# Patient Record
Sex: FEMALE | Race: WHITE | HISPANIC OR LATINO | Employment: FULL TIME | URBAN - METROPOLITAN AREA
[De-identification: names, ages, dates, MRNs, and addresses within clinical notes are randomized per-mention and may not be internally consistent; named-entity substitution may affect disease eponyms.]

---

## 2019-11-22 ENCOUNTER — HOSPITAL ENCOUNTER (EMERGENCY)
Facility: HOSPITAL | Age: 54
Discharge: HOME | End: 2019-11-23
Attending: EMERGENCY MEDICINE | Admitting: EMERGENCY MEDICINE
Payer: COMMERCIAL

## 2019-11-22 ENCOUNTER — APPOINTMENT (EMERGENCY)
Dept: RADIOLOGY | Facility: HOSPITAL | Age: 54
End: 2019-11-22
Payer: COMMERCIAL

## 2019-11-22 VITALS
RESPIRATION RATE: 16 BRPM | DIASTOLIC BLOOD PRESSURE: 65 MMHG | BODY MASS INDEX: 24.31 KG/M2 | WEIGHT: 151.24 LBS | SYSTOLIC BLOOD PRESSURE: 136 MMHG | TEMPERATURE: 97.7 F | OXYGEN SATURATION: 97 % | HEART RATE: 76 BPM | HEIGHT: 66 IN

## 2019-11-22 DIAGNOSIS — M54.50 ACUTE LOW BACK PAIN: Primary | ICD-10-CM

## 2019-11-22 DIAGNOSIS — M62.830 SPASM OF MUSCLE OF LOWER BACK: ICD-10-CM

## 2019-11-22 PROCEDURE — 96372 THER/PROPH/DIAG INJ SC/IM: CPT

## 2019-11-22 PROCEDURE — 99283 EMERGENCY DEPT VISIT LOW MDM: CPT

## 2019-11-22 PROCEDURE — 72100 X-RAY EXAM L-S SPINE 2/3 VWS: CPT

## 2019-11-22 RX ORDER — KETOROLAC TROMETHAMINE 30 MG/ML
30 INJECTION, SOLUTION INTRAMUSCULAR; INTRAVENOUS ONCE
Status: COMPLETED | OUTPATIENT
Start: 2019-11-22 | End: 2019-11-22

## 2019-11-22 RX ORDER — METHOCARBAMOL 500 MG/1
500 TABLET, FILM COATED ORAL ONCE
Status: COMPLETED | OUTPATIENT
Start: 2019-11-22 | End: 2019-11-22

## 2019-11-22 RX ORDER — LIDOCAINE 50 MG/G
1 PATCH TOPICAL ONCE
Status: DISCONTINUED | OUTPATIENT
Start: 2019-11-22 | End: 2019-11-23 | Stop reason: HOSPADM

## 2019-11-22 RX ADMIN — METHOCARBAMOL TABLETS 500 MG: 500 TABLET, COATED ORAL at 23:35

## 2019-11-22 RX ADMIN — KETOROLAC TROMETHAMINE 30 MG: 30 INJECTION, SOLUTION INTRAMUSCULAR at 23:33

## 2019-11-22 RX ADMIN — LIDOCAINE 1 PATCH: 50 PATCH TOPICAL at 23:35

## 2019-11-23 PROCEDURE — 99284 EMERGENCY DEPT VISIT MOD MDM: CPT | Performed by: EMERGENCY MEDICINE

## 2019-11-23 RX ORDER — LIDOCAINE 50 MG/G
1 PATCH TOPICAL DAILY PRN
Qty: 30 PATCH | Refills: 0 | Status: SHIPPED | OUTPATIENT
Start: 2019-11-23

## 2019-11-23 RX ORDER — METHOCARBAMOL 500 MG/1
500 TABLET, FILM COATED ORAL 4 TIMES DAILY PRN
Qty: 28 TABLET | Refills: 0 | Status: SHIPPED | OUTPATIENT
Start: 2019-11-23

## 2019-11-23 NOTE — DISCHARGE INSTRUCTIONS
Take ibuprofen (Motrin, Advil) or acetaminophen (Tylenol) as needed for pain, as per the instructions  Use ice or heat as it helps  Use topical medications, such as Yossi-Santos or icy Hot, to the area, to help with the pain  Do stretching exercises to keep the muscles of your back loose  Take the muscle relaxer as needed for continued severe pain  Do your normal daily activities, but avoid strenuous activities or heavy lifting until you begin to feel better  Follow up your primary care doctor and the [address] [place] [address] for further evaluation and management of your back pain

## 2019-11-23 NOTE — ED PROVIDER NOTES
History  Chief Complaint   Patient presents with    Back Pain     Back pain started around 1800 pt was folding clothes and "back gave out" Pt reports difficulty walking  HPI    None       Past Medical History:   Diagnosis Date    Back pain     HIV (human immunodeficiency virus infection) ([facility] [address] )        Past Surgical History:   Procedure Laterality Date    CHOLECYSTECTOMY         History reviewed  No pertinent family history  I have reviewed and agree with the history as documented  Social History     Tobacco Use    Smoking status: Never Smoker    Smokeless tobacco: Never Used   Substance Use Topics    Alcohol use: Not Currently    Drug use: Never        Review of Systems    Physical Exam  Physical Exam   Constitutional: She is oriented to person, place, and time  She appears well-developed and well-nourished  No distress  HENT:   Head: Normocephalic and atraumatic  Eyes: Pupils are equal, round, and reactive to light  Conjunctivae are normal    Neck: Normal range of motion  No tracheal deviation present  Cardiovascular: Normal rate, regular rhythm and intact distal pulses  Pulmonary/Chest: Effort normal  No respiratory distress  Abdominal: Soft  She exhibits no distension  There is no tenderness  Musculoskeletal:        Lumbar back: She exhibits decreased range of motion (due to pain, in all directions; requires assistance to move from reclined to seated position due to pain) and tenderness (paraspinal, lower lumbar)  She exhibits no bony tenderness and no spasm  Back:    Neurological: She is alert and oriented to person, place, and time  GCS eye subscore is 4  GCS verbal subscore is 5  GCS motor subscore is 6  Reflex Scores:       Bicep reflexes are 2+ on the right side and 2+ on the left side  Normal strength and sensation bilateral legs  No saddle anesthesia  Skin: Skin is warm and dry  Psychiatric: She has a normal mood and affect   Her behavior is normal    Nursing note and vitals reviewed  Vital Signs  ED Triage Vitals [11/22/19 2254]   Temperature Pulse Respirations Blood Pressure SpO2   97 7 °F (36 5 °C) 76 16 136/65 97 %      Temp Source Heart Rate Source Patient Position - Orthostatic VS BP Location FiO2 (%)   Oral Monitor Sitting Right arm --      Pain Score       8           Vitals:    11/22/19 2254   BP: 136/65   Pulse: 76   Patient Position - Orthostatic VS: Sitting         Visual Acuity      ED Medications  Medications   lidocaine (LIDODERM) 5 % patch 1 patch (1 patch Topical Medication Applied 11/22/19 2335)   ketorolac (TORADOL) injection 30 mg (30 mg Intramuscular Given 11/22/19 2333)   methocarbamol (ROBAXIN) tablet 500 mg (500 mg Oral Given 11/22/19 2335)       Diagnostic Studies  Results Reviewed     None                 XR lumbar spine 2 or 3 views    (Results Pending)              Procedures  Procedures       ED Course                               MDM  Number of Diagnoses or Management Options  Acute low back pain: new and requires workup  Spasm of muscle of lower back: new and requires workup  Diagnosis management comments: This is a 47-year-old female who presents here today with low back pain  She states she has had mild problems with back pain before, usually exacerbated by standing for prolonged periods of time  She states it usually resolves with topical medications of acetaminophen, and has never seen anybody for this before  She states that she was on her feet for a prolonged period earlier this morning, but was not having any pain or issues at that time  At around 1800 she was folding laundry when she had sudden onset of pain  She states it is diffusely across her lower low back, it is a sharp pain  She says she is fine when she is sitting and still  It hurts worse with movement    She says she is having a hard time walking as while walking she will suddenly feel like her back grabs her, and has almost fallen over because of the sudden worsening of pain  She states both sides hurt equally  She denies any radiation of pain out of the back  She denies any weakness or numbness in her legs, abdominal pain, bowel or bladder incontinence  She did take 500 milligrams of acetaminophen without improvement of the pain  She has not taken or done anything else to try and help pain  She states the pain was not improving, prompting her to come in for evaluation  She does have a history of HIV, and states her counts were last checked back in June and were undetectable  She takes [facility] for [facility]  She denies prior significant complications related to either the HIV or her medications  She denies any other medical problems  Review of systems:  Otherwise negative unless stated as above    She is well-appearing, in no acute distress  She does have pain with trying to sit up in bed but is able to do so  She does have paraspinal tenderness to the low lumbar back bilaterally  Exam is otherwise unremarkable  I am not concerned about spinal compressive process such as cauda equina syndrome, spinal epidural abscess or hematoma  She is not having fevers or other systemic symptoms to suggest atypical infection, especially in the setting of undetectable viral load and no history of AIDS  One of the side effects of her anti-retroviral medication includes bony fractures, so she is at greater risk for lumbar spine fracture contributing to her worsening of pain  Given the intermittent grabbing sensation that she is having years at least a component of muscle spasms from this  We will check an x-ray of her back given possible side effects of her medications, and treat her pain while here  X-ray was reviewed by myself and shows no acute abnormalities  I discussed with the patient and her  findings, treatment at home, follow-up, and indications for return, and they expressed understanding with this plan         Amount and/or Complexity of Data Reviewed  Tests in the radiology section of CPT®: ordered and reviewed  Independent visualization of images, tracings, or specimens: yes        Disposition  Final diagnoses:   Acute low back pain   Spasm of muscle of lower back     Time reflects when diagnosis was documented in both MDM as applicable and the Disposition within this note     Time User Action Codes Description Comment    11/23/2019 12:34 AM Mahoney-Tesoriero, Delphina Castleman Add [M54 5] Acute low back pain     11/23/2019 12:34 AM Mahoney-Tesoriero, Delphina Castleman Add [R27 722] Spasm of muscle of lower back       ED Disposition     ED Disposition Condition Date/Time Comment    Discharge Good Sat Nov 23, 2019 12:34 AM Victorino Corrales discharge to home/self care        Follow-up Information     Follow up With Specialties Details Why Contact Info    your primary care doctor  Schedule an appointment as soon as possible for a visit  to follow up on your back pain     [facility]  Call  to set up an appointment with a local primary care doctor [phone]            Patient's Medications   Discharge Prescriptions    LIDOCAINE (LIDODERM) 5 %    Apply 1 patch topically daily as needed (back pain) Remove & Discard patch within 12 hours or as directed by MD       Start Date: 11/23/2019End Date: --       Order Dose: 1 patch       Quantity: 30 patch    Refills: 0    METHOCARBAMOL (ROBAXIN) 500 MG TABLET    Take 1 tablet (500 mg total) by mouth 4 (four) times a day as needed for muscle spasms (back pain)       Start Date: 11/23/2019End Date: --       Order Dose: 500 mg       Quantity: 28 tablet    Refills: 0         ED Provider  Electronically Signed by           Harsha Tuttle MD  11/23/19 0966

## 2019-11-25 ENCOUNTER — TELEPHONE (OUTPATIENT)
Dept: PHYSICAL THERAPY | Facility: OTHER | Age: 54
End: 2019-11-25

## 2019-11-25 NOTE — TELEPHONE ENCOUNTER
Voice mail/message left for patient to return call to [place] [id number] program including our hours of business and phone number  Nurse will need to address patients place of residence and proceed per protocol  Per EMR patient lives in [place] and shows one visit in [facility] system  Deferred per protocol for f/u

## 2019-11-27 ENCOUNTER — TELEPHONE (OUTPATIENT)
Dept: PHYSICAL THERAPY | Facility: OTHER | Age: 54
End: 2019-11-27

## 2019-11-27 NOTE — TELEPHONE ENCOUNTER
Voice mail/message left for patient to return call to [place] [id number] program including our hours of business and phone number  Patient to clarify place of residence before triage  Deferred per protocol for f/u call

## 2019-12-02 ENCOUNTER — TELEPHONE (OUTPATIENT)
Dept: PHYSICAL THERAPY | Facility: OTHER | Age: 54
End: 2019-12-02

## 2019-12-02 NOTE — TELEPHONE ENCOUNTER
Voice message left for patient to call back  Phone number and hours of business provided  This the 3rd attempt to reach the patient  Referral Closed

## 2021-12-30 ENCOUNTER — NURSE TRIAGE (OUTPATIENT)
Dept: OTHER | Facility: OTHER | Age: 56
End: 2021-12-30

## 2021-12-30 DIAGNOSIS — Z11.52 ENCOUNTER FOR SCREENING FOR COVID-19: Primary | ICD-10-CM

## 2021-12-30 PROCEDURE — 87636 SARSCOV2 & INF A&B AMP PRB: CPT | Performed by: FAMILY MEDICINE

## 2022-09-28 ENCOUNTER — APPOINTMENT (OUTPATIENT)
Dept: RADIOLOGY | Facility: HOSPITAL | Age: 57
End: 2022-09-28
Payer: COMMERCIAL

## 2022-09-28 ENCOUNTER — HOSPITAL ENCOUNTER (EMERGENCY)
Facility: HOSPITAL | Age: 57
Discharge: HOME | End: 2022-09-28
Attending: EMERGENCY MEDICINE
Payer: COMMERCIAL

## 2022-09-28 VITALS
TEMPERATURE: 99.3 F | RESPIRATION RATE: 15 BRPM | DIASTOLIC BLOOD PRESSURE: 72 MMHG | OXYGEN SATURATION: 99 % | BODY MASS INDEX: 25.9 KG/M2 | HEART RATE: 106 BPM | HEIGHT: 67 IN | WEIGHT: 165 LBS | SYSTOLIC BLOOD PRESSURE: 139 MMHG

## 2022-09-28 DIAGNOSIS — J30.2 SEASONAL ALLERGIES: ICD-10-CM

## 2022-09-28 DIAGNOSIS — R06.00 ACUTE DYSPNEA: ICD-10-CM

## 2022-09-28 DIAGNOSIS — J06.9 URI WITH COUGH AND CONGESTION: Primary | ICD-10-CM

## 2022-09-28 DIAGNOSIS — R07.89 NON-CARDIAC CHEST PAIN: ICD-10-CM

## 2022-09-28 LAB
ALBUMIN SERPL BCP-MCNC: 4 G/DL (ref 3.5–5)
ALP SERPL-CCNC: 171 U/L (ref 46–116)
ALT SERPL W P-5'-P-CCNC: 23 U/L (ref 12–78)
ANION GAP SERPL CALCULATED.3IONS-SCNC: 9 MMOL/L (ref 4–13)
AST SERPL W P-5'-P-CCNC: 12 U/L (ref 5–45)
BASOPHILS # BLD AUTO: 0.06 THOUSANDS/ΜL (ref 0–0.1)
BASOPHILS NFR BLD AUTO: 1 % (ref 0–1)
BILIRUB DIRECT SERPL-MCNC: 0.11 MG/DL (ref 0–0.2)
BILIRUB SERPL-MCNC: 0.46 MG/DL (ref 0.2–1)
BUN SERPL-MCNC: 11 MG/DL (ref 5–25)
CALCIUM SERPL-MCNC: 9.4 MG/DL (ref 8.3–10.1)
CARDIAC TROPONIN I PNL SERPL HS: <2 NG/L
CARDIAC TROPONIN I PNL SERPL HS: <2 NG/L
CHLORIDE SERPL-SCNC: 106 MMOL/L (ref 96–108)
CO2 SERPL-SCNC: 28 MMOL/L (ref 21–32)
CREAT SERPL-MCNC: 0.9 MG/DL (ref 0.6–1.3)
D DIMER PPP FEU-MCNC: <0.27 UG/ML FEU
EOSINOPHIL # BLD AUTO: 0.14 THOUSAND/ΜL (ref 0–0.61)
EOSINOPHIL NFR BLD AUTO: 2 % (ref 0–6)
ERYTHROCYTE [DISTWIDTH] IN BLOOD BY AUTOMATED COUNT: 13.5 % (ref 11.6–15.1)
FLUAV RNA RESP QL NAA+PROBE: NEGATIVE
FLUBV RNA RESP QL NAA+PROBE: NEGATIVE
GFR SERPL CREATININE-BSD FRML MDRD: 70 ML/MIN/1.73SQ M
GLUCOSE SERPL-MCNC: 118 MG/DL (ref 65–140)
HCT VFR BLD AUTO: 41 % (ref 34.8–46.1)
HGB BLD-MCNC: 13.7 G/DL (ref 11.5–15.4)
IMM GRANULOCYTES # BLD AUTO: 0.01 THOUSAND/UL (ref 0–0.2)
IMM GRANULOCYTES NFR BLD AUTO: 0 % (ref 0–2)
LYMPHOCYTES # BLD AUTO: 1.66 THOUSANDS/ΜL (ref 0.6–4.47)
LYMPHOCYTES NFR BLD AUTO: 25 % (ref 14–44)
MAGNESIUM SERPL-MCNC: 1.8 MG/DL (ref 1.6–2.6)
MCH RBC QN AUTO: 30 PG (ref 26.8–34.3)
MCHC RBC AUTO-ENTMCNC: 33.4 G/DL (ref 31.4–37.4)
MCV RBC AUTO: 90 FL (ref 82–98)
MONOCYTES # BLD AUTO: 0.68 THOUSAND/ΜL (ref 0.17–1.22)
MONOCYTES NFR BLD AUTO: 10 % (ref 4–12)
NEUTROPHILS # BLD AUTO: 4.05 THOUSANDS/ΜL (ref 1.85–7.62)
NEUTS SEG NFR BLD AUTO: 62 % (ref 43–75)
NRBC BLD AUTO-RTO: 0 /100 WBCS
PLATELET # BLD AUTO: 263 THOUSANDS/UL (ref 149–390)
PMV BLD AUTO: 10.3 FL (ref 8.9–12.7)
POTASSIUM SERPL-SCNC: 3.6 MMOL/L (ref 3.5–5.3)
PROT SERPL-MCNC: 8.6 G/DL (ref 6.4–8.4)
RBC # BLD AUTO: 4.57 MILLION/UL (ref 3.81–5.12)
RSV RNA RESP QL NAA+PROBE: NEGATIVE
SARS-COV-2 RNA RESP QL NAA+PROBE: NEGATIVE
SODIUM SERPL-SCNC: 143 MMOL/L (ref 135–147)
WBC # BLD AUTO: 6.6 THOUSAND/UL (ref 4.31–10.16)

## 2022-09-28 PROCEDURE — 0241U HB NFCT DS VIR RESP RNA 4 TRGT: CPT | Performed by: EMERGENCY MEDICINE

## 2022-09-28 PROCEDURE — 99285 EMERGENCY DEPT VISIT HI MDM: CPT

## 2022-09-28 PROCEDURE — 83735 ASSAY OF MAGNESIUM: CPT | Performed by: EMERGENCY MEDICINE

## 2022-09-28 PROCEDURE — 80076 HEPATIC FUNCTION PANEL: CPT | Performed by: EMERGENCY MEDICINE

## 2022-09-28 PROCEDURE — 85379 FIBRIN DEGRADATION QUANT: CPT | Performed by: EMERGENCY MEDICINE

## 2022-09-28 PROCEDURE — 80048 BASIC METABOLIC PNL TOTAL CA: CPT | Performed by: EMERGENCY MEDICINE

## 2022-09-28 PROCEDURE — 96374 THER/PROPH/DIAG INJ IV PUSH: CPT

## 2022-09-28 PROCEDURE — 94640 AIRWAY INHALATION TREATMENT: CPT

## 2022-09-28 PROCEDURE — 84484 ASSAY OF TROPONIN QUANT: CPT | Performed by: EMERGENCY MEDICINE

## 2022-09-28 PROCEDURE — 96361 HYDRATE IV INFUSION ADD-ON: CPT

## 2022-09-28 PROCEDURE — 99285 EMERGENCY DEPT VISIT HI MDM: CPT | Performed by: EMERGENCY MEDICINE

## 2022-09-28 PROCEDURE — 85025 COMPLETE CBC W/AUTO DIFF WBC: CPT | Performed by: EMERGENCY MEDICINE

## 2022-09-28 PROCEDURE — 71046 X-RAY EXAM CHEST 2 VIEWS: CPT

## 2022-09-28 PROCEDURE — 93005 ELECTROCARDIOGRAM TRACING: CPT

## 2022-09-28 PROCEDURE — 36415 COLL VENOUS BLD VENIPUNCTURE: CPT | Performed by: EMERGENCY MEDICINE

## 2022-09-28 RX ORDER — IPRATROPIUM BROMIDE AND ALBUTEROL SULFATE 2.5; .5 MG/3ML; MG/3ML
3 SOLUTION RESPIRATORY (INHALATION) ONCE
Status: COMPLETED | OUTPATIENT
Start: 2022-09-28 | End: 2022-09-28

## 2022-09-28 RX ORDER — KETOROLAC TROMETHAMINE 30 MG/ML
15 INJECTION, SOLUTION INTRAMUSCULAR; INTRAVENOUS ONCE
Status: COMPLETED | OUTPATIENT
Start: 2022-09-28 | End: 2022-09-28

## 2022-09-28 RX ORDER — ALBUTEROL SULFATE 90 UG/1
2 AEROSOL, METERED RESPIRATORY (INHALATION) ONCE
Status: COMPLETED | OUTPATIENT
Start: 2022-09-28 | End: 2022-09-28

## 2022-09-28 RX ORDER — LORATADINE 10 MG/1
10 TABLET ORAL ONCE
Status: COMPLETED | OUTPATIENT
Start: 2022-09-28 | End: 2022-09-28

## 2022-09-28 RX ADMIN — ALBUTEROL SULFATE 2 PUFF: 90 AEROSOL, METERED RESPIRATORY (INHALATION) at 12:44

## 2022-09-28 RX ADMIN — LORATADINE 10 MG: 10 TABLET ORAL at 12:46

## 2022-09-28 RX ADMIN — IPRATROPIUM BROMIDE AND ALBUTEROL SULFATE 3 ML: 2.5; .5 SOLUTION RESPIRATORY (INHALATION) at 12:46

## 2022-09-28 RX ADMIN — KETOROLAC TROMETHAMINE 15 MG: 30 INJECTION, SOLUTION INTRAMUSCULAR at 12:40

## 2022-09-28 RX ADMIN — SODIUM CHLORIDE 1000 ML: 0.9 INJECTION, SOLUTION INTRAVENOUS at 12:41

## 2022-09-28 NOTE — ED PROVIDER NOTES
History  Chief Complaint   Patient presents with    Chest Pain     Pt reports pressure in the chest that "takes my breath away" that began yesterday worsening today  Recently felt allergies/cold like symptoms- multiple negative at home covid tests      Patient is a 64-year-old female with past medical history of asthma, HIV on anti-retroviral medication with undetectable viral load, Hashimoto's thyroiditis but not currently on thyroid medication due to normal thyroid function tests, osteoporosis, cholecystectomy, presents to the emergency department complaining of acute difficulty breathing and chest discomfort that started earlier today  Patient states for the past several days she has had a cold or allergy symptoms  She has had itchy and runny nose, itchy scratchy throat, dry cough and today she had an episode where she felt a pressure in her chest that took her breath away  She states that it was a weird feeling" that she has never experienced before  She describes the sensation in her chest as actual chest pain  She denies any palpitations but states she does get this from time to time and attributes that to anxiety  Patient denies any diaphoresis, recent fevers or chills, headache, dizziness or near syncope, hemoptysis, abdominal pain, nausea, vomiting, change in bowel habits, urinary symptoms, skin rash or color change, leg pain or swelling, extremity weakness or paresthesia or other focal neurologic deficits  Patient reports taking multiple home COVID tests which were negative  History provided by:  Patient   used: No    Chest Pain  Associated symptoms: cough and shortness of breath    Associated symptoms: no abdominal pain, no back pain, no diaphoresis, no dizziness, no dysphagia, no fever, no headache, no nausea, no numbness, no palpitations, not vomiting and no weakness        Prior to Admission Medications   Prescriptions Last Dose Informant Patient Reported? Taking? lidocaine (LIDODERM) 5 %   No No   Sig: Apply 1 patch topically daily as needed (back pain) Remove & Discard patch within 12 hours or as directed by MD   methocarbamol (ROBAXIN) 500 mg tablet   No No   Sig: Take 1 tablet (500 mg total) by mouth 4 (four) times a day as needed for muscle spasms (back pain)      Facility-Administered Medications: None       Past Medical History:   Diagnosis Date    Back pain     Hashimoto's disease     [facility]        Past Surgical History:   Procedure Laterality Date    CHOLECYSTECTOMY         History reviewed  No pertinent family history  I have reviewed and agree with the history as documented  E-Cigarette/Vaping     E-Cigarette/Vaping Substances     Social History     Tobacco Use    Smoking status: Never Smoker    Smokeless tobacco: Never Used   Substance Use Topics    Alcohol use: Not Currently    Drug use: Never       Review of Systems   Constitutional: Negative for chills, diaphoresis and fever  HENT: Positive for postnasal drip and rhinorrhea  Negative for congestion, ear pain, sore throat, trouble swallowing and voice change  +Itchy/scratchy throat   Respiratory: Positive for cough and shortness of breath  Negative for chest tightness and wheezing  Cardiovascular: Positive for chest pain  Negative for palpitations and leg swelling  Gastrointestinal: Negative for abdominal pain, constipation, diarrhea, nausea and vomiting  Genitourinary: Negative for dysuria, flank pain, frequency and hematuria  Musculoskeletal: Negative for back pain, neck pain and neck stiffness  Skin: Negative for color change, pallor, rash and wound  Allergic/Immunologic: Positive for immunocompromised state  Neurological: Negative for dizziness, syncope, weakness, light-headedness, numbness and headaches  Hematological: Negative for adenopathy  Does not bruise/bleed easily     Psychiatric/Behavioral: Negative for confusion and decreased concentration  The patient is nervous/anxious  All other systems reviewed and are negative  Physical Exam  Physical Exam  Vitals and nursing note reviewed  Constitutional:       General: She is not in acute distress  Appearance: Normal appearance  She is well-developed  She is not ill-appearing, toxic-appearing or diaphoretic  HENT:      Head: Normocephalic and atraumatic  Right Ear: External ear normal       Left Ear: External ear normal       Mouth/Throat:      Mouth: Mucous membranes are moist       Pharynx: Oropharynx is clear  Posterior oropharyngeal erythema present  No oropharyngeal exudate  Eyes:      Extraocular Movements: Extraocular movements intact  Conjunctiva/sclera: Conjunctivae normal    Neck:      Vascular: No JVD  Cardiovascular:      Rate and Rhythm: Normal rate and regular rhythm  Pulses: Normal pulses  Heart sounds: Normal heart sounds  No murmur heard  No friction rub  No gallop  Pulmonary:      Effort: Pulmonary effort is normal  No respiratory distress  Breath sounds: Normal breath sounds  No wheezing, rhonchi or rales  Abdominal:      General: There is no distension  Palpations: Abdomen is soft  Tenderness: There is no abdominal tenderness  There is no guarding or rebound  Musculoskeletal:         General: No swelling or tenderness  Normal range of motion  Cervical back: Normal range of motion and neck supple  No rigidity  Right lower leg: No edema  Left lower leg: No edema  Skin:     General: Skin is warm and dry  Coloration: Skin is not pale  Findings: No erythema or rash  Neurological:      General: No focal deficit present  Mental Status: She is alert and oriented to person, place, and time  Sensory: No sensory deficit  Motor: No weakness     Psychiatric:         Mood and Affect: Mood normal          Behavior: Behavior normal          Vital Signs  ED Triage Vitals [09/28/22 1208]   Temperature Pulse Respirations Blood Pressure SpO2   99 3 °F (37 4 °C) 101 20 146/74 97 %      Temp Source Heart Rate Source Patient Position - Orthostatic VS BP Location FiO2 (%)   Temporal Monitor Sitting Left arm --      Pain Score       No Pain         Vitals:    09/28/22 1208 09/28/22 1300 09/28/22 1330   BP: 146/74 141/68 139/72   BP Location: Left arm Left arm Left arm   Pulse: 101 95 (!) 106   Resp: 20 13 15   Temp: 99 3 °F (37 4 °C)     TempSrc: Temporal     SpO2: 97% 100% 99%   Weight: 74 8 kg (165 lb)     Height: 5' 7" (1 702 m)         Visual Acuity      ED Medications  Medications   sodium chloride 0 9 % bolus 1,000 mL (1,000 mL Intravenous New Bag 9/28/22 1241)   ipratropium-albuterol (DUO-NEB) 0 5-2 5 mg/3 mL inhalation solution 3 mL (3 mL Nebulization Given 9/28/22 1246)   loratadine (CLARITIN) tablet 10 mg (10 mg Oral Given 9/28/22 1246)   albuterol (PROVENTIL HFA,VENTOLIN HFA) inhaler 2 puff (2 puffs Inhalation Given 9/28/22 1244)   ketorolac (TORADOL) injection 15 mg (15 mg Intravenous Given 9/28/22 1240)       Diagnostic Studies  Results Reviewed     Procedure Component Value Units Date/Time    HS Troponin I 2hr [[id number]] Collected: 09/28/22 1517    Lab Status: Final result Specimen: Blood from Arm, Left Updated: 09/28/22 1547     hs TnI 2hr <2 ng/L      Delta 2hr hsTnI --    FLU/RSV/COVID - if FLU/RSV clinically relevant [[id number]]  (Normal) Collected: 09/28/22 1240    Lab Status: Final result Specimen: Nares from Nose Updated: 09/28/22 1349     SARS-CoV-2 Negative     INFLUENZA A PCR Negative     INFLUENZA B PCR Negative     RSV PCR Negative    Narrative:      FOR PEDIATRIC PATIENTS - copy/paste COVID Guidelines URL to browser: [email or web address]  [email or web address]    SARS-CoV-2 assay is a Nucleic Acid Amplification assay intended for the  qualitative detection of nucleic acid from SARS-CoV-2 in nasopharyngeal  swabs   Results are for the presumptive identification of SARS-CoV-2 RNA  Positive results are indicative of infection with SARS-CoV-2, the virus  causing COVID-19, but do not rule out bacterial infection or co-infection  with other viruses  Laboratories within the [place] and its  territories are required to report all positive results to the appropriate  public health authorities  Negative results do not preclude SARS-CoV-2  infection and should not be used as the sole basis for treatment or other  patient management decisions  Negative results must be combined with  clinical observations, patient history, and epidemiological information  This test has not been FDA cleared or approved  This test has been authorized by FDA under an Emergency Use Authorization  (EUA)  This test is only authorized for the duration of time the  declaration that circumstances exist justifying the authorization of the  emergency use of an in vitro diagnostic tests for detection of SARS-CoV-2  virus and/or diagnosis of COVID-19 infection under section 564(b)(1) of  the Act, 21 U  S C  036JQX-4(Q)(1), unless the authorization is terminated  or revoked sooner  The test has been validated but independent review by FDA  and CLIA is pending  Test performed using [employer] GeneXpert: This RT-PCR assay targets N2,  a region unique to SARS-CoV-2  A conserved region in the E-gene was chosen  for pan-Sarbecovirus detection which includes SARS-CoV-2  According to CMS-2020-01-R, this platform meets the definition of high-throughput technology      Basic metabolic panel [[id number]] Collected: 09/28/22 1240    Lab Status: Final result Specimen: Blood from Arm, Left Updated: 09/28/22 1322     Sodium 143 mmol/L      Potassium 3 6 mmol/L      Chloride 106 mmol/L      CO2 28 mmol/L      ANION GAP 9 mmol/L      BUN 11 mg/dL      Creatinine 0 90 mg/dL      Glucose 118 mg/dL      Calcium 9 4 mg/dL      eGFR 70 ml/min/1 73sq m     Narrative:      National Kidney Disease Foundation guidelines for Chronic Kidney Disease (CKD):     Stage 1 with normal or high GFR (GFR > 90 mL/min/1 73 square meters)    Stage 2 Mild CKD (GFR = 60-89 mL/min/1 73 square meters)    Stage 3A Moderate CKD (GFR = 45-59 mL/min/1 73 square meters)    Stage 3B Moderate CKD (GFR = 30-44 mL/min/1 73 square meters)    Stage 4 Severe CKD (GFR = 15-29 mL/min/1 73 square meters)    Stage 5 End Stage CKD (GFR <15 mL/min/1 73 square meters)  Note: GFR calculation is accurate only with a steady state creatinine    Magnesium [[id number]]  (Normal) Collected: 09/28/22 1240    Lab Status: Final result Specimen: Blood from Arm, Left Updated: 09/28/22 1322     Magnesium 1 8 mg/dL     Hepatic function panel [[id number]]  (Abnormal) Collected: 09/28/22 1240    Lab Status: Final result Specimen: Blood from Arm, Left Updated: 09/28/22 1322     Total Bilirubin 0 46 mg/dL      Bilirubin, Direct 0 11 mg/dL      Alkaline Phosphatase 171 U/L      AST 12 U/L      ALT 23 U/L      Total Protein 8 6 g/dL      Albumin 4 0 g/dL     HS Troponin 0hr (reflex protocol) [[id number]]  (Normal) Collected: 09/28/22 1240    Lab Status: Final result Specimen: Blood from Arm, Left Updated: 09/28/22 1319     hs TnI 0hr <2 ng/L     D-Dimer [[id number]]  (Normal) Collected: 09/28/22 1240    Lab Status: Final result Specimen: Blood from Arm, Left Updated: 09/28/22 1315     D-Dimer, Quant <0 27 ug/ml FEU     Narrative: In the evaluation for possible pulmonary embolism, in the appropriate (Well's Score of 4 or less) patient, the age adjusted d-dimer cutoff for this patient can be calculated as:    Age x 0 01 (in ug/mL) for Age-adjusted D-dimer exclusion threshold for a patient over 50 years      CBC and differential [[id number]] Collected: 09/28/22 1240    Lab Status: Final result Specimen: Blood from Arm, Left Updated: 09/28/22 1255     WBC 6 60 Thousand/uL      RBC 4 57 Million/uL      Hemoglobin 13 7 g/dL      Hematocrit 41 0 %      MCV 90 fL MCH 30 0 pg      MCHC 33 4 g/dL      RDW 13 5 %      MPV 10 3 fL      Platelets 307 Thousands/uL      nRBC 0 /100 WBCs      Neutrophils Relative 62 %      Immat GRANS % 0 %      Lymphocytes Relative 25 %      Monocytes Relative 10 %      Eosinophils Relative 2 %      Basophils Relative 1 %      Neutrophils Absolute 4 05 Thousands/µL      Immature Grans Absolute 0 01 Thousand/uL      Lymphocytes Absolute 1 66 Thousands/µL      Monocytes Absolute 0 68 Thousand/µL      Eosinophils Absolute 0 14 Thousand/µL      Basophils Absolute 0 06 Thousands/µL                  XR chest 2 views   ED Interpretation by Rahul Luque DO (09/28 1420)   No acute abnormality in the chest                  Procedures  ECG 12 Lead Documentation Only    Date/Time: 9/28/2022 12:09 PM  Performed by: Rahul Luque DO  Authorized by: Rahul Luque DO     ECG reviewed by me, the ED Provider: yes    Patient location:  ED  Rate:     ECG rate:  97    ECG rate assessment: normal    Rhythm:     Rhythm: sinus rhythm    Ectopy:     Ectopy: none    QRS:     QRS axis:  Normal    QRS intervals:  Normal  Conduction:     Conduction: abnormal      Abnormal conduction: incomplete RBBB    ST segments:     ST segments:  Normal  T waves:     T waves: normal      ECG 12 Lead Documentation Only    Date/Time: 9/28/2022 3:00 PM  Performed by: Rahul Luque DO  Authorized by: Rahul Luque DO     ECG reviewed by me, the ED Provider: yes    Patient location:  ED  Previous ECG:     Previous ECG:  Compared to current    Similarity:  No change  Rate:     ECG rate:  103    ECG rate assessment: tachycardic    Rhythm:     Rhythm: sinus tachycardia    Ectopy:     Ectopy: none    QRS:     QRS axis:  Normal    QRS intervals:  Normal  Conduction:     Conduction: abnormal      Abnormal conduction: incomplete RBBB    ST segments:     ST segments:  Normal  T waves:     T waves: normal               ED Course  ED Course as of 09/28/22 1551   Wed Sep 28, 2022   1455 Updated patient about normal workup thus far  Patient overall feeling better  Waiting on repeat troponin EKG but if unremarkable, will discharge home  1550 Updated patient about repeat troponin and EKG being normal   Patient stable for discharge at this time  Recommended she follow-up with a PCP  Discussed symptomatic management at home and when to return to the ER  HEART Risk Score    Flowsheet Row Most Recent Value   Heart Score Risk Calculator    History 0 Filed at: 09/28/2022 1456   ECG 0 Filed at: 09/28/2022 1456   Age 1 Filed at: 09/28/2022 1456   Risk Factors 0 Filed at: 09/28/2022 1456   Troponin 0 Filed at: 09/28/2022 1456   HEART Score 1 Filed at: 09/28/2022 1456                  MDM  Number of Diagnoses or Management Options  Diagnosis management comments: 24-year-old female presents to the ED for acute dyspnea and chest discomfort earlier today in the setting of recent dry cough and URI symptoms  Differential includes bronchospasm, musculoskeletal pain, anxiety attack, acute PE, pericarditis, pleurisy, pneumonia, esophageal spasm or GERD  Will workup with cardiac labs, D-dimer and EKG  Pending D-dimer will either obtain chest x-ray or CTA chest   Will give DuoNeb breathing treatment for possible bronchospasm, Toradol for throat discomfort, Claritin for allergies         Amount and/or Complexity of Data Reviewed  Clinical lab tests: ordered and reviewed  Tests in the radiology section of CPT®: ordered and reviewed  Tests in the medicine section of CPT®: reviewed and ordered  Independent visualization of images, tracings, or specimens: yes        Disposition  Final diagnoses:   URI with cough and congestion   Seasonal allergies   Acute dyspnea   Non-cardiac chest pain     Time reflects when diagnosis was documented in both MDM as applicable and the Disposition within this note     Time User Action Codes Description Comment    9/28/2022  3:48 PM Malachi Bevels E Add [J06 9] URI with cough and congestion     9/28/2022  3:48 PM Albion Bevels E Add [J30 2] Seasonal allergies     9/28/2022  3:49 PM Albion Bevels E Add [R06 00] Acute dyspnea     9/28/2022  3:49 PM Albion Bevels E Add [R07 89] Non-cardiac chest pain       ED Disposition     ED Disposition   Discharge    Condition   Stable    Date/Time   Wed Sep 28, 2022  3:49 PM    Comment   Zi Graver discharge to home/self care  Follow-up Information     Follow up With Specialties Details Why Contact Info Additional Information    Infolink  Call  To establish care with a primary care doctor if you do not already have one [address] [facility] Emergency Department Emergency Medicine Go to  If symptoms worsen [address] Emergency Department, [address] [place] [address], [facility]          Patient's Medications   Discharge Prescriptions    No medications on file       No discharge procedures on file      PDMP Review     None          ED Provider  Electronically Signed by           Jerod Gonzalez DO  09/28/22 9325

## 2022-09-30 LAB
ATRIAL RATE: 103 BPM
P AXIS: 70 DEGREES
PR INTERVAL: 154 MS
QRS AXIS: 62 DEGREES
QRSD INTERVAL: 88 MS
QT INTERVAL: 366 MS
QTC INTERVAL: 479 MS
T WAVE AXIS: 57 DEGREES
VENTRICULAR RATE: 103 BPM

## 2022-09-30 PROCEDURE — 93010 ELECTROCARDIOGRAM REPORT: CPT | Performed by: INTERNAL MEDICINE

## 2023-05-16 ENCOUNTER — HOSPITAL ENCOUNTER (OUTPATIENT)
Facility: HOSPITAL | Age: 58
Setting detail: OBSERVATION
Discharge: HOME | End: 2023-05-17
Attending: EMERGENCY MEDICINE | Admitting: STUDENT IN AN ORGANIZED HEALTH CARE EDUCATION/TRAINING PROGRAM

## 2023-05-16 ENCOUNTER — APPOINTMENT (EMERGENCY)
Dept: RADIOLOGY | Facility: HOSPITAL | Age: 58
End: 2023-05-16

## 2023-05-16 DIAGNOSIS — R00.2 PALPITATIONS: Primary | ICD-10-CM

## 2023-05-16 DIAGNOSIS — N39.0 URINARY TRACT INFECTION WITHOUT HEMATURIA, SITE UNSPECIFIED: ICD-10-CM

## 2023-05-16 PROBLEM — Z21 HIV (HUMAN IMMUNODEFICIENCY VIRUS INFECTION) (HCC): Status: ACTIVE | Noted: 2023-05-16

## 2023-05-16 PROBLEM — K21.9 GERD (GASTROESOPHAGEAL REFLUX DISEASE): Status: ACTIVE | Noted: 2023-05-16

## 2023-05-16 PROBLEM — B20 HIV (HUMAN IMMUNODEFICIENCY VIRUS INFECTION) (HCC): Status: ACTIVE | Noted: 2023-05-16

## 2023-05-16 LAB
2HR DELTA HS TROPONIN: <0 NG/L
4HR DELTA HS TROPONIN: <0 NG/L
ALBUMIN SERPL BCP-MCNC: 4.7 G/DL (ref 3.5–5)
ALP SERPL-CCNC: 151 U/L (ref 34–104)
ALT SERPL W P-5'-P-CCNC: 14 U/L (ref 7–52)
ANION GAP SERPL CALCULATED.3IONS-SCNC: 8 MMOL/L (ref 4–13)
AST SERPL W P-5'-P-CCNC: 14 U/L (ref 13–39)
BASOPHILS # BLD AUTO: 0.09 THOUSANDS/ÂΜL (ref 0–0.1)
BASOPHILS NFR BLD AUTO: 1 % (ref 0–1)
BILIRUB SERPL-MCNC: 0.48 MG/DL (ref 0.2–1)
BUN SERPL-MCNC: 16 MG/DL (ref 5–25)
CALCIUM SERPL-MCNC: 10.3 MG/DL (ref 8.4–10.2)
CARDIAC TROPONIN I PNL SERPL HS: 2 NG/L
CARDIAC TROPONIN I PNL SERPL HS: <2 NG/L
CARDIAC TROPONIN I PNL SERPL HS: <2 NG/L
CHLORIDE SERPL-SCNC: 104 MMOL/L (ref 96–108)
CO2 SERPL-SCNC: 24 MMOL/L (ref 21–32)
CREAT SERPL-MCNC: 0.71 MG/DL (ref 0.6–1.3)
EOSINOPHIL # BLD AUTO: 0.36 THOUSAND/ÂΜL (ref 0–0.61)
EOSINOPHIL NFR BLD AUTO: 4 % (ref 0–6)
ERYTHROCYTE [DISTWIDTH] IN BLOOD BY AUTOMATED COUNT: 13.6 % (ref 11.6–15.1)
GFR SERPL CREATININE-BSD FRML MDRD: 92 ML/MIN/1.73SQ M
GLUCOSE SERPL-MCNC: 107 MG/DL (ref 65–140)
HCT VFR BLD AUTO: 40.8 % (ref 34.8–46.1)
HGB BLD-MCNC: 13.6 G/DL (ref 11.5–15.4)
IMM GRANULOCYTES # BLD AUTO: 0.01 THOUSAND/UL (ref 0–0.2)
IMM GRANULOCYTES NFR BLD AUTO: 0 % (ref 0–2)
LYMPHOCYTES # BLD AUTO: 2.47 THOUSANDS/ÂΜL (ref 0.6–4.47)
LYMPHOCYTES NFR BLD AUTO: 30 % (ref 14–44)
MAGNESIUM SERPL-MCNC: 2.1 MG/DL (ref 1.9–2.7)
MCH RBC QN AUTO: 29.7 PG (ref 26.8–34.3)
MCHC RBC AUTO-ENTMCNC: 33.3 G/DL (ref 31.4–37.4)
MCV RBC AUTO: 89 FL (ref 82–98)
MONOCYTES # BLD AUTO: 0.82 THOUSAND/ÂΜL (ref 0.17–1.22)
MONOCYTES NFR BLD AUTO: 10 % (ref 4–12)
NEUTROPHILS # BLD AUTO: 4.45 THOUSANDS/ÂΜL (ref 1.85–7.62)
NEUTS SEG NFR BLD AUTO: 55 % (ref 43–75)
NRBC BLD AUTO-RTO: 0 /100 WBCS
PLATELET # BLD AUTO: 269 THOUSANDS/UL (ref 149–390)
PMV BLD AUTO: 10 FL (ref 8.9–12.7)
POTASSIUM SERPL-SCNC: 4 MMOL/L (ref 3.5–5.3)
PROT SERPL-MCNC: 8.4 G/DL (ref 6.4–8.4)
RBC # BLD AUTO: 4.58 MILLION/UL (ref 3.81–5.12)
SODIUM SERPL-SCNC: 136 MMOL/L (ref 135–147)
TSH SERPL DL<=0.05 MIU/L-ACNC: 0.68 UIU/ML (ref 0.45–4.5)
WBC # BLD AUTO: 8.2 THOUSAND/UL (ref 4.31–10.16)

## 2023-05-16 RX ORDER — ACETAMINOPHEN 325 MG/1
650 TABLET ORAL EVERY 6 HOURS PRN
Status: DISCONTINUED | OUTPATIENT
Start: 2023-05-16 | End: 2023-05-17 | Stop reason: HOSPADM

## 2023-05-16 RX ORDER — NITROFURANTOIN 25; 75 MG/1; MG/1
100 CAPSULE ORAL
Status: DISCONTINUED | OUTPATIENT
Start: 2023-05-16 | End: 2023-05-17 | Stop reason: HOSPADM

## 2023-05-16 RX ORDER — FAMOTIDINE 20 MG/1
40 TABLET, FILM COATED ORAL DAILY
Status: DISCONTINUED | OUTPATIENT
Start: 2023-05-17 | End: 2023-05-17 | Stop reason: HOSPADM

## 2023-05-16 RX ADMIN — SODIUM CHLORIDE 1000 ML: 0.9 INJECTION, SOLUTION INTRAVENOUS at 18:36

## 2023-05-16 NOTE — ED PROVIDER NOTES
History  Chief Complaint   Patient presents with   • Palpitations     Pt reporting palpitations that started 2 days ago  Pt reports the sensation is intermittent  59 y o  F presents w palpitations 2 days ago  Intermittent sensation  History of Hashimoto's thyroiditis, but no history of similar sensation in the past   Patient denies chest pain, denies shortness of breath  No fever, chills, no upper respiratory tract infection symptoms  Prior to Admission Medications   Prescriptions Last Dose Informant Patient Reported? Taking? Peuwnhbnch-Fswizusv-Pownnwi AF (ODEFSEY PO) Unknown  Yes No   Sig: Take by mouth   famotidine (PEPCID) 40 MG tablet Unknown  Yes No   Sig: Take 40 mg by mouth daily      Facility-Administered Medications: None       Past Medical History:   Diagnosis Date   • Back pain    • Hashimoto's disease    • [facility]    • Urinary tract infection        Past Surgical History:   Procedure Laterality Date   • CHOLECYSTECTOMY         No family history on file  I have reviewed and agree with the history as documented  E-Cigarette/Vaping     E-Cigarette/Vaping Substances     Social History     Tobacco Use   • Smoking status: Never   • Smokeless tobacco: Never   Substance Use Topics   • Alcohol use: Not Currently   • Drug use: Never       Review of Systems   Constitutional: Positive for fatigue  Negative for chills, diaphoresis and fever  HENT: Negative for congestion and sore throat  Respiratory: Negative for cough, chest tightness and shortness of breath  Cardiovascular: Positive for palpitations  Negative for chest pain and leg swelling  Gastrointestinal: Negative for abdominal pain, constipation, diarrhea, nausea and vomiting  Genitourinary: Negative for dysuria and flank pain  Musculoskeletal: Negative for back pain and neck pain  Skin: Negative for color change and rash     Allergic/Immunologic: Negative for immunocompromised state    Neurological: Negative for dizziness, syncope and headaches  Physical Exam  Physical Exam  Vitals reviewed  Constitutional:       General: She is not in acute distress  Appearance: She is well-developed  She is not ill-appearing, toxic-appearing or diaphoretic  HENT:      Head: Normocephalic and atraumatic  Mouth/Throat:      Mouth: Mucous membranes are moist    Eyes:      General: No scleral icterus  Right eye: No discharge  Left eye: No discharge  Conjunctiva/sclera: Conjunctivae normal       Pupils: Pupils are equal, round, and reactive to light  Neck:      Vascular: No JVD  Cardiovascular:      Rate and Rhythm: Normal rate and regular rhythm  Heart sounds: Normal heart sounds  No murmur heard  No friction rub  No gallop  Pulmonary:      Effort: Pulmonary effort is normal  No respiratory distress  Breath sounds: Normal breath sounds  No wheezing or rales  Chest:      Chest wall: No tenderness  Abdominal:      General: Bowel sounds are normal  There is no distension  Palpations: Abdomen is soft  Tenderness: There is no abdominal tenderness  There is no right CVA tenderness, left CVA tenderness or guarding  Musculoskeletal:         General: No tenderness or deformity  Normal range of motion  Cervical back: Normal range of motion and neck supple  Right lower leg: No edema  Left lower leg: No edema  Skin:     General: Skin is warm and dry  Coloration: Skin is not pale  Findings: No erythema or rash  Neurological:      Mental Status: She is alert and oriented to person, place, and time  Cranial Nerves: No cranial nerve deficit     Psychiatric:         Behavior: Behavior normal          Vital Signs  ED Triage Vitals   Temperature Pulse Respirations Blood Pressure SpO2   05/16/23 1542 05/16/23 1542 05/16/23 1542 05/16/23 1542 05/16/23 1542   98 °F (36 7 °C) 91 18 (!) 169/110 98 %      Temp Source Heart Rate Source Patient Position - Orthostatic VS BP Location FiO2 (%)   05/16/23 1542 05/16/23 1542 05/16/23 1542 05/16/23 1542 --   Temporal Monitor Sitting Left arm       Pain Score       05/16/23 1839       No Pain           Vitals:    05/17/23 1100 05/17/23 1500 05/17/23 1533 05/17/23 1600   BP: 114/67  129/75 122/71   Pulse: 79 86 92 86   Patient Position - Orthostatic VS:    Lying         Visual Acuity  Visual Acuity    Flowsheet Row Most Recent Value   L Pupil Size (mm) 3   R Pupil Size (mm) 3          ED Medications  Medications   acetaminophen (TYLENOL) tablet 650 mg (has no administration in time range)   famotidine (PEPCID) tablet 40 mg (has no administration in time range)   nitrofurantoin (MACROBID) extended-release capsule 100 mg (100 mg Oral Given 5/17/23 0007)   Vgmvgydoxi-Iqgvkyxi-Dwmytez AF (ODEFSEY) tablet 1 tablet (1 tablet Oral Given 5/17/23 1148)   metoprolol succinate (TOPROL-XL) 24 hr tablet 25 mg (has no administration in time range)   sodium chloride 0 9 % bolus 1,000 mL (0 mL Intravenous Stopped 5/16/23 2225)       Diagnostic Studies  Results Reviewed     Procedure Component Value Units Date/Time    Basic metabolic panel [[id number]]  (Abnormal) Collected: 05/17/23 0448    Lab Status: Final result Specimen: Blood from Arm, Left Updated: 05/17/23 0512     Sodium 140 mmol/L      Potassium 3 8 mmol/L      Chloride 109 mmol/L      CO2 26 mmol/L      ANION GAP 5 mmol/L      BUN 12 mg/dL      Creatinine 0 67 mg/dL      Glucose 103 mg/dL      Calcium 9 5 mg/dL      eGFR 95 ml/min/1 73sq m     Narrative:      [facility] guidelines for Chronic Kidney Disease (CKD):   •  Stage 1 with normal or high GFR (GFR > 90 mL/min/1 73 square meters)  •  Stage 2 Mild CKD (GFR = 60-89 mL/min/1 73 square meters)  •  Stage 3A Moderate CKD (GFR = 45-59 mL/min/1 73 square meters)  •  Stage 3B Moderate CKD (GFR = 30-44 mL/min/1 73 square meters)  •  Stage 4 Severe CKD (GFR = 15-29 mL/min/1 73 square meters)  •  Stage 5 End Stage CKD (GFR <15 mL/min/1 73 square meters)  Note: GFR calculation is accurate only with a steady state creatinine    CBC (With Platelets) [[id number]]  (Normal) Collected: 05/17/23 0448    Lab Status: Final result Specimen: Blood from Arm, Left Updated: 05/17/23 0455     WBC 6 62 Thousand/uL      RBC 4 11 Million/uL      Hemoglobin 12 2 g/dL      Hematocrit 36 5 %      MCV 89 fL      MCH 29 7 pg      MCHC 33 4 g/dL      RDW 13 5 %      Platelets 022 Thousands/uL      MPV 10 1 fL     HS Troponin I 4hr [[id number]]  (Normal) Collected: 05/16/23 2029    Lab Status: Final result Specimen: Blood from Arm, Left Updated: 05/16/23 2100     hs TnI 4hr <2 ng/L      Delta 4hr hsTnI <0 ng/L     HS Troponin I 2hr [[id number]]  (Normal) Collected: 05/16/23 1836    Lab Status: Final result Specimen: Blood from Arm, Left Updated: 05/16/23 1927     hs TnI 2hr <2 ng/L      Delta 2hr hsTnI <0 ng/L     TSH, 3rd generation with Free T4 reflex [[id number]]  (Normal) Collected: 05/16/23 1836    Lab Status: Final result Specimen: Blood from Arm, Left Updated: 05/16/23 1923     TSH 3RD GENERATON 0 681 uIU/mL     Magnesium [[id number]]  (Normal) Collected: 05/16/23 1836    Lab Status: Final result Specimen: Blood from Arm, Left Updated: 05/16/23 1906     Magnesium 2 1 mg/dL     HS Troponin 0hr (reflex protocol) [[id number]]  (Normal) Collected: 05/16/23 1546    Lab Status: Final result Specimen: Blood from Arm, Left Updated: 05/16/23 1630     hs TnI 0hr 2 ng/L     Comprehensive metabolic panel [[id number]]  (Abnormal) Collected: 05/16/23 1546    Lab Status: Final result Specimen: Blood from Arm, Left Updated: 05/16/23 1608     Sodium 136 mmol/L      Potassium 4 0 mmol/L      Chloride 104 mmol/L      CO2 24 mmol/L      ANION GAP 8 mmol/L      BUN 16 mg/dL      Creatinine 0 71 mg/dL      Glucose 107 mg/dL      Calcium 10 3 mg/dL      AST 14 U/L      ALT 14 U/L      Alkaline Phosphatase 151 U/L      Total Protein 8 4 g/dL      Albumin 4 7 g/dL      Total Bilirubin 0 48 mg/dL      eGFR 92 ml/min/1 73sq m     Narrative:      [employer] guidelines for Chronic Kidney Disease (CKD):   •  Stage 1 with normal or high GFR (GFR > 90 mL/min/1 73 square meters)  •  Stage 2 Mild CKD (GFR = 60-89 mL/min/1 73 square meters)  •  Stage 3A Moderate CKD (GFR = 45-59 mL/min/1 73 square meters)  •  Stage 3B Moderate CKD (GFR = 30-44 mL/min/1 73 square meters)  •  Stage 4 Severe CKD (GFR = 15-29 mL/min/1 73 square meters)  •  Stage 5 End Stage CKD (GFR <15 mL/min/1 73 square meters)  Note: GFR calculation is accurate only with a steady state creatinine    CBC and differential [[id number]] Collected: 05/16/23 1546    Lab Status: Final result Specimen: Blood from Arm, Left Updated: 05/16/23 1280     WBC 8 20 Thousand/uL      RBC 4 58 Million/uL      Hemoglobin 13 6 g/dL      Hematocrit 40 8 %      MCV 89 fL      MCH 29 7 pg      MCHC 33 3 g/dL      RDW 13 6 %      MPV 10 0 fL      Platelets 498 Thousands/uL      nRBC 0 /100 WBCs      Neutrophils Relative 55 %      Immat GRANS % 0 %      Lymphocytes Relative 30 %      Monocytes Relative 10 %      Eosinophils Relative 4 %      Basophils Relative 1 %      Neutrophils Absolute 4 45 Thousands/µL      Immature Grans Absolute 0 01 Thousand/uL      Lymphocytes Absolute 2 47 Thousands/µL      Monocytes Absolute 0 82 Thousand/µL      Eosinophils Absolute 0 36 Thousand/µL      Basophils Absolute 0 09 Thousands/µL                  XR chest 2 views   ED Interpretation by Edilma Stewart DO (05/16 2114)   No acute cardiopulmonary abnormalities      Final Result by Mordecai Kehr, MD (50/94 [id number])      No acute cardiopulmonary disease        Findings are stable            Workstation performed: [id number]                    Procedures  Procedures         ED Course  ED Course as of 05/17/23 1656   Tue May 16, 2023   1957 Magnesium: 2 1   1955 TSH 3RD GENERATON: 0 681   2058 Delta 2hr hsTnI: <0   2130 TT sent to cards to discuss PACs / atrial bigeminy                                              Medical Decision Making  Palpitations  ACS work-up, electrolytes, thyroid function studies  IV fluid    ACS work-up is normal here, no electrolyte abnormalities  Patient does continue to have irregular heartbeat with multiple PACs, atrial bigeminy, discussed with cardiology, advised admission for echo and monitoring overnight  Patient agreeable with plan  Amount and/or Complexity of Data Reviewed  Labs: ordered  Decision-making details documented in ED Course  Radiology: ordered and independent interpretation performed  Risk  Decision regarding hospitalization  Disposition  Final diagnoses:   Palpitations     Time reflects when diagnosis was documented in both MDM as applicable and the Disposition within this note     Time User Action Codes Description Comment    5/16/2023 10:38 PM Juan Bowman Add [R00 2] Palpitations     5/16/2023 10:39 PM Phylicia Jung Modify [R00 2] Palpitations     5/17/2023  3:47 PM Yulia Dumont Add [N39 0] Urinary tract infection without hematuria, site unspecified       ED Disposition     ED Disposition   Admit    Condition   Stable    Date/Time   Tue May 16, 2023 10:39 PM    Comment   Case was discussed with   Sandeep Barajas (SLIM AP) and the patient's admission status was agreed to be Admission Status: observation status to the service of Dr [facility] Carrillo [facility])   Follow-up Information    None         Patient's Medications   Discharge Prescriptions    METOPROLOL SUCCINATE (TOPROL-XL) 25 MG 24 HR TABLET    Take 1 tablet (25 mg total) by mouth daily Do not start before May 18, 2023         Start Date: 5/18/2023 End Date: 6/17/2023       Order Dose: 25 mg       Quantity: 30 tablet    Refills: 0    NITROFURANTOIN (MACROBID) 100 MG CAPSULE    Take 1 capsule (100 mg total) by mouth daily at bedtime       Start Date: 5/17/2023 End Date: -- Order Dose: 100 mg       Quantity: --    Refills: 0       Outpatient Discharge Orders   Holter monitor   Standing Status: Future Standing Exp   Date: 05/17/27       PDMP Review     None          ED Provider  Electronically Signed by           Rosalva Hernandez DO  05/17/23 0599

## 2023-05-17 ENCOUNTER — APPOINTMENT (OUTPATIENT)
Dept: NUCLEAR MEDICINE | Facility: HOSPITAL | Age: 58
End: 2023-05-17

## 2023-05-17 ENCOUNTER — APPOINTMENT (OUTPATIENT)
Dept: NON INVASIVE DIAGNOSTICS | Facility: HOSPITAL | Age: 58
End: 2023-05-17

## 2023-05-17 VITALS
RESPIRATION RATE: 20 BRPM | WEIGHT: 165 LBS | HEIGHT: 67 IN | OXYGEN SATURATION: 96 % | SYSTOLIC BLOOD PRESSURE: 124 MMHG | TEMPERATURE: 98 F | DIASTOLIC BLOOD PRESSURE: 64 MMHG | HEART RATE: 83 BPM | BODY MASS INDEX: 25.9 KG/M2

## 2023-05-17 LAB
ANION GAP SERPL CALCULATED.3IONS-SCNC: 5 MMOL/L (ref 4–13)
AORTIC ROOT: 3 CM
APICAL FOUR CHAMBER EJECTION FRACTION: 70 %
ASCENDING AORTA: 3.1 CM
ATRIAL RATE: 77 BPM
ATRIAL RATE: 89 BPM
ATRIAL RATE: 91 BPM
BUN SERPL-MCNC: 12 MG/DL (ref 5–25)
CALCIUM SERPL-MCNC: 9.5 MG/DL (ref 8.4–10.2)
CHLORIDE SERPL-SCNC: 109 MMOL/L (ref 96–108)
CO2 SERPL-SCNC: 26 MMOL/L (ref 21–32)
CREAT SERPL-MCNC: 0.67 MG/DL (ref 0.6–1.3)
E WAVE DECELERATION TIME: 153 MS
ERYTHROCYTE [DISTWIDTH] IN BLOOD BY AUTOMATED COUNT: 13.5 % (ref 11.6–15.1)
FRACTIONAL SHORTENING: 31 % (ref 28–44)
GFR SERPL CREATININE-BSD FRML MDRD: 95 ML/MIN/1.73SQ M
GLUCOSE SERPL-MCNC: 103 MG/DL (ref 65–140)
HCT VFR BLD AUTO: 36.5 % (ref 34.8–46.1)
HGB BLD-MCNC: 12.2 G/DL (ref 11.5–15.4)
INTERVENTRICULAR SEPTUM IN DIASTOLE (PARASTERNAL SHORT AXIS VIEW): 0.8 CM
INTERVENTRICULAR SEPTUM: 0.8 CM (ref 0.6–1.1)
LAAS-AP2: 13.4 CM2
LAAS-AP4: 12.3 CM2
LEFT ATRIUM AREA SYSTOLE SINGLE PLANE A4C: 12.7 CM2
LEFT ATRIUM SIZE: 2.8 CM
LEFT INTERNAL DIMENSION IN SYSTOLE: 3.3 CM (ref 2.1–4)
LEFT VENTRICULAR INTERNAL DIMENSION IN DIASTOLE: 4.8 CM (ref 3.5–6)
LEFT VENTRICULAR POSTERIOR WALL IN END DIASTOLE: 0.7 CM
LEFT VENTRICULAR STROKE VOLUME: 62 ML
LVSV (TEICH): 62 ML
MAX HR PERCENT: 103 %
MAX HR: 166 BPM
MCH RBC QN AUTO: 29.7 PG (ref 26.8–34.3)
MCHC RBC AUTO-ENTMCNC: 33.4 G/DL (ref 31.4–37.4)
MCV RBC AUTO: 89 FL (ref 82–98)
MV E'TISSUE VEL-SEP: 10 CM/S
MV PEAK A VEL: 0.56 M/S
MV PEAK E VEL: 56 CM/S
MV STENOSIS PRESSURE HALF TIME: 44 MS
MV VALVE AREA P 1/2 METHOD: 5 CM2
P AXIS: 77 DEGREES
P AXIS: 78 DEGREES
P AXIS: 79 DEGREES
PLATELET # BLD AUTO: 223 THOUSANDS/UL (ref 149–390)
PMV BLD AUTO: 10.1 FL (ref 8.9–12.7)
POTASSIUM SERPL-SCNC: 3.8 MMOL/L (ref 3.5–5.3)
PR INTERVAL: 142 MS
PR INTERVAL: 162 MS
PR INTERVAL: 174 MS
QRS AXIS: 64 DEGREES
QRS AXIS: 68 DEGREES
QRS AXIS: 76 DEGREES
QRSD INTERVAL: 86 MS
QRSD INTERVAL: 88 MS
QRSD INTERVAL: 90 MS
QT INTERVAL: 366 MS
QT INTERVAL: 366 MS
QT INTERVAL: 390 MS
QTC INTERVAL: 441 MS
QTC INTERVAL: 445 MS
QTC INTERVAL: 450 MS
RATE PRESSURE PRODUCT: NORMAL
RBC # BLD AUTO: 4.11 MILLION/UL (ref 3.81–5.12)
RIGHT ATRIUM AREA SYSTOLE A4C: 11.4 CM2
RIGHT VENTRICLE ID DIMENSION: 3 CM
SL CV LEFT ATRIUM LENGTH A2C: 5 CM
SL CV PED ECHO LEFT VENTRICLE DIASTOLIC VOLUME (MOD BIPLANE) 2D: 106 ML
SL CV PED ECHO LEFT VENTRICLE SYSTOLIC VOLUME (MOD BIPLANE) 2D: 44 ML
SL CV REST NUCLEAR ISOTOPE DOSE: 10.7 MCI
SL CV STRESS NUCLEAR ISOTOPE DOSE: 29 MCI
SL CV STRESS RECOVERY BP: NORMAL MMHG
SL CV STRESS RECOVERY HR: 100 BPM
SL CV STRESS STAGE REACHED: 2
SODIUM SERPL-SCNC: 140 MMOL/L (ref 135–147)
STRESS BASELINE BP: NORMAL MMHG
STRESS BASELINE HR: 83 BPM
STRESS O2 SAT REST: 98 %
STRESS PEAK HR: 166 BPM
STRESS POST ESTIMATED WORKLOAD: 7 METS
STRESS POST EXERCISE DUR MIN: 6 MIN
STRESS POST EXERCISE DUR SEC: 0 SEC
STRESS POST O2 SAT PEAK: 98 %
STRESS POST PEAK BP: 142 MMHG
T WAVE AXIS: 48 DEGREES
T WAVE AXIS: 54 DEGREES
T WAVE AXIS: 54 DEGREES
TR MAX PG: 13 MMHG
TR PEAK VELOCITY: 1.8 M/S
TRICUSPID ANNULAR PLANE SYSTOLIC EXCURSION: 2.3 CM
TRICUSPID VALVE PEAK REGURGITATION VELOCITY: 1.8 M/S
VENTRICULAR RATE: 77 BPM
VENTRICULAR RATE: 89 BPM
VENTRICULAR RATE: 91 BPM
WBC # BLD AUTO: 6.62 THOUSAND/UL (ref 4.31–10.16)

## 2023-05-17 RX ORDER — METOPROLOL SUCCINATE 25 MG/1
25 TABLET, EXTENDED RELEASE ORAL DAILY
Status: DISCONTINUED | OUTPATIENT
Start: 2023-05-18 | End: 2023-05-17 | Stop reason: HOSPADM

## 2023-05-17 RX ORDER — METOPROLOL SUCCINATE 25 MG/1
25 TABLET, EXTENDED RELEASE ORAL DAILY
Qty: 30 TABLET | Refills: 0 | Status: SHIPPED | OUTPATIENT
Start: 2023-05-18 | End: 2023-06-17

## 2023-05-17 RX ORDER — NITROFURANTOIN 25; 75 MG/1; MG/1
100 CAPSULE ORAL
Refills: 0
Start: 2023-05-17

## 2023-05-17 RX ORDER — FAMOTIDINE 40 MG/1
40 TABLET, FILM COATED ORAL DAILY
COMMUNITY

## 2023-05-17 RX ADMIN — NITROFURANTOIN (MONOHYDRATE/MACROCRYSTALS) 100 MG: 75; 25 CAPSULE ORAL at 00:07

## 2023-05-17 RX ADMIN — EMTRICITABINE, RILPIVIRINE HYDROCHLORIDE, AND TENOFOVIR ALAFENAMIDE 1 TABLET: 200; 25; 25 TABLET ORAL at 11:48

## 2023-05-17 NOTE — H&P
[address] [place] [address]  H&P  Name: Lilia Bourbon 59 y o  female I MRN: [id number]  Unit/Bed#: ED 24 I Date of Admission: 2023   Date of Service: 2023 I Hospital Day: 0      Assessment/Plan   * Palpitations  Assessment & Plan  Patient presenting to the ED for evaluation of chest palpitations  Reports symptoms have been intermittent over the last 2 days  Denies any associated dizziness, chest pain, SOB, abdominal pain, nausea or vomiting  · While in the ED, patient episodes of bigeminy and trigeminy  ED spoke to Cardiology who recommend admission for cardiac observation  · ECG: NSR without acute ischemic changes  · Troponin level: 0hr 2, 2hr <2, 4hr <2  · Keep potassium 4 or greater and magnesium 2 or greater  · Noted history of Hashimoto's; TSH within normal limits  · Check Echo  · Monitor on telemetry  · Consult to Cardiology, recommendations are appreciated    UTI (urinary tract infection)  Assessment & Plan  · Reports chronic UTI  · Follows w/outpatient urology and is on long-term PO macrobid course  · Continue 100mg PO macrobid qHS   · Continue outpatient f/u (reports having appt in )    GERD (gastroesophageal reflux disease)  Assessment & Plan  · Continue daily PPI    HIV (human immunodeficiency virus infection) ([facility] )  Assessment & Plan  · Continue Odefsey (patient instructed to bring from home as is not on hospital formulary)       VTE Prophylaxis: Pharmacologic VTE Prophylaxis contraindicated due to low risk  / sequential compression device   Code Status: Level 1 code  Discussion with family: Significant other at bedside     Anticipated Length of Stay:  Patient will be admitted on an Observation basis with an anticipated length of stay of  Less than 2 midnights  Justification for Hospital Stay: Palpitations    Total Time for Visit, including Counseling / Coordination of Care: 90 minutes    Greater than 50% of this total time spent on direct patient counseling and coordination of care     Chief Complaint:   Palpitations    History of Present Illness:    Isaac Redmond is a 59 y o  female who has a past medical history for GERD, HIV, Hashimoto's  Patient presenting to the ED for evaluation of chest palpitations  Reports symptoms have been intermittent over the last 2 days  Denies any associated dizziness, chest pain, SOB, abdominal pain, nausea or vomiting  Patient requiring medical admission for cardiac observation  All patient questions answered to the best of my ability  Review of Systems:    Review of Systems   Constitutional: Negative for chills and fever  HENT: Negative for ear pain and sore throat  Eyes: Negative for pain and visual disturbance  Respiratory: Negative for cough and shortness of breath  Cardiovascular: Positive for palpitations  Negative for chest pain  Gastrointestinal: Negative for abdominal pain and vomiting  Genitourinary: Negative for dysuria and hematuria  Musculoskeletal: Negative for arthralgias and back pain  Skin: Negative for color change and rash  Neurological: Negative for seizures and syncope  All other systems reviewed and are negative  Past Medical and Surgical History:     Past Medical History:   Diagnosis Date   • Back pain    • Hashimoto's disease    • HIV (human immunodeficiency virus infection) ([facility] )    • Urinary tract infection        Past Surgical History:   Procedure Laterality Date   • CHOLECYSTECTOMY         Meds/Allergies:    Prior to Admission medications    Medication Sig Start Date End Date Taking?  Authorizing Provider   lidocaine (LIDODERM) 5 % Apply 1 patch topically daily as needed (back pain) Remove & Discard patch within 12 hours or as directed by MD 11/23/19   Vivien Arteaga MD   methocarbamol (ROBAXIN) 500 mg tablet Take 1 tablet (500 mg total) by mouth 4 (four) times a day as needed for muscle spasms (back pain) 11/23/19   Vivien Arteaga MD     I have reviewed home medications using allscripts  Allergies: Allergies   Allergen Reactions   • Sesame Oil - Food Allergy    • Sulfa Antibiotics Rash       Social History:     Marital Status: /Civil Union   Occupation: NA  Patient Pre-hospital Living Situation: Home  Patient Pre-hospital Level of Mobility: Walks  Patient Pre-hospital Diet Restrictions: None  Substance Use History:   Social History     Substance and Sexual Activity   Alcohol Use Not Currently     Social History     Tobacco Use   Smoking Status Never   Smokeless Tobacco Never     Social History     Substance and Sexual Activity   Drug Use Never       Family History:    No family history on file  Physical Exam:     Vitals:   Blood Pressure: 123/72 (05/16/23 2130)  Pulse: 82 (05/16/23 2130)  Temperature: 98 °F (36 7 °C) (05/16/23 1542)  Temp Source: Temporal (05/16/23 1542)  Respirations: 18 (05/16/23 2130)  SpO2: 98 % (05/16/23 2130)    Physical Exam  Vitals and nursing note reviewed  Constitutional:       General: She is not in acute distress  Appearance: She is well-developed  HENT:      Head: Normocephalic and atraumatic  Eyes:      Conjunctiva/sclera: Conjunctivae normal    Cardiovascular:      Rate and Rhythm: Normal rate and regular rhythm  Heart sounds: No murmur heard  Pulmonary:      Effort: Pulmonary effort is normal  No respiratory distress  Breath sounds: Normal breath sounds  Abdominal:      Palpations: Abdomen is soft  Tenderness: There is no abdominal tenderness  Musculoskeletal:         General: No swelling  Cervical back: Neck supple  Skin:     General: Skin is warm and dry  Capillary Refill: Capillary refill takes less than 2 seconds  Neurological:      Mental Status: She is alert and oriented to person, place, and time  Psychiatric:         Mood and Affect: Mood normal        Additional Data:     Lab Results: I have personally reviewed pertinent reports        Results from last 7 days   Lab Units 05/16/23  1546   WBC Thousand/uL 8 20   HEMOGLOBIN g/dL 13 6   HEMATOCRIT % 40 8   PLATELETS Thousands/uL 269   NEUTROS PCT % 55   LYMPHS PCT % 30   MONOS PCT % 10   EOS PCT % 4     Results from last 7 days   Lab Units 05/16/23  1546   SODIUM mmol/L 136   POTASSIUM mmol/L 4 0   CHLORIDE mmol/L 104   CO2 mmol/L 24   BUN mg/dL 16   CREATININE mg/dL 0 71   ANION GAP mmol/L 8   CALCIUM mg/dL 10 3*   ALBUMIN g/dL 4 7   TOTAL BILIRUBIN mg/dL 0 48   ALK PHOS U/L 151*   ALT U/L 14   AST U/L 14   GLUCOSE RANDOM mg/dL 107                       Imaging: I have personally reviewed pertinent reports  XR chest 2 views   ED Interpretation by Gilmar Hernández DO (05/16 2114)   No acute cardiopulmonary abnormalities          EKG, Pathology, and Other Studies Reviewed on Admission:   · EKG: Reviewed    Allscripts / Epic Records Reviewed: Yes     ** Please Note: This note has been constructed using a voice recognition system   **

## 2023-05-17 NOTE — ED NOTES
In no acute distress  Visitors at the bedside no c/o offered   Pt aware bed remains pending     Abbey Ward RN  05/17/23 9165

## 2023-05-17 NOTE — UTILIZATION REVIEW
Initial Clinical Review    Admission: Date/Time/Statement:   Admission Orders (From admission, onward)     Ordered        05/16/23 2238  Place in Observation  Once                      Orders Placed This Encounter   Procedures   • Place in Observation     Standing Status:   Standing     Number of Occurrences:   1     Order Specific Question:   Level of Care     Answer:   Med Surg [16]     ED Arrival Information     Expected   -    Arrival   5/16/2023 15:39    Acuity   Urgent            Means of arrival   Walk-In    Escorted by   Family Member    Service   Hospitalist    Admission type   Emergency            Arrival complaint   Chest Pain            Chief Complaint   Patient presents with   • Palpitations     Pt reporting palpitations that started 2 days ago  Pt reports the sensation is intermittent  Initial Presentation: 59 y o  female presents to the ED from home with c/o chest palpitations intermittently x 2 days  No associated symptoms  PMH: GERD, HIV, Hashimoto's  In the ED she was HTN  Labs - elevated alk phos, negative troponins  Imaging - no acute disease  Treated with IV fluids, Macrobid  On exam no deficits  She is admitted to OBSERVATION status with Palpitations - Echo, tele, cardio consult  UTI - chronic - long term PO Macrobid  Date: 5/17   Day 2:     BP stable        ED Triage Vitals   Temperature Pulse Respirations Blood Pressure SpO2   05/16/23 1542 05/16/23 1542 05/16/23 1542 05/16/23 1542 05/16/23 1542   98 °F (36 7 °C) 91 18 (!) 169/110 98 %      Temp Source Heart Rate Source Patient Position - Orthostatic VS BP Location FiO2 (%)   05/16/23 1542 05/16/23 1542 05/16/23 1542 05/16/23 1542 --   Temporal Monitor Sitting Left arm       Pain Score       05/16/23 1839       No Pain          Wt Readings from Last 1 Encounters:   09/28/22 74 8 kg (165 lb)     Additional Vital Signs:   05/17/23 0843 -- 81 17 111/67 -- 97 % None (Room air) Sitting   05/17/23 0600 -- 90 19 119/57 79 96 % -- --   05/17/23 0400 -- 80 18 136/74 99 94 % None (Room air) Lying   05/17/23 0200 -- 84 18 120/71 90 97 % None (Room air) Sitting   05/16/23 2330 -- 82 18 125/71 93 98 % None (Room air) Sitting   05/16/23 2130 -- 82 18 123/72 93 98 % None (Room air) Sitting   05/16/23 2030 -- 74 18 129/75 97 99 % None (Room air) Sitting   05/16/23 1839 -- 74 18 118/73 91 100 % None (Room air) Sitting     Pertinent Labs/Diagnostic Test Results:     5/16 ECG -     5/16 Echo -     5/17 ECG - Sinus rhythm with frequent Premature ventricular complexes  Otherwise normal ECG    5/17 NM Stress test - P     XR chest 2 views   ED Interpretation by Jerman Payne DO (05/16 2114)   No acute cardiopulmonary abnormalities      Final Result by Harjinder Pineda MD (05/17 6018)      No acute cardiopulmonary disease        Findings are stable            Workstation performed: [id number]               Results from last 7 days   Lab Units 05/17/23 0448 05/16/23  1546   WBC Thousand/uL 6 62 8 20   HEMOGLOBIN g/dL 12 2 13 6   HEMATOCRIT % 36 5 40 8   PLATELETS Thousands/uL 223 269   NEUTROS ABS Thousands/µL  --  4 45         Results from last 7 days   Lab Units 05/17/23 0448 05/16/23 1836 05/16/23  1546   SODIUM mmol/L 140  --  136   POTASSIUM mmol/L 3 8  --  4 0   CHLORIDE mmol/L 109*  --  104   CO2 mmol/L 26  --  24   ANION GAP mmol/L 5  --  8   BUN mg/dL 12  --  16   CREATININE mg/dL 0 67  --  0 71   EGFR ml/min/1 73sq m 95  --  92   CALCIUM mg/dL 9 5  --  10 3*   MAGNESIUM mg/dL  --  2 1  --      Results from last 7 days   Lab Units 05/16/23  1546   AST U/L 14   ALT U/L 14   ALK PHOS U/L 151*   TOTAL PROTEIN g/dL 8 4   ALBUMIN g/dL 4 7   TOTAL BILIRUBIN mg/dL 0 48         Results from last 7 days   Lab Units 05/17/23  0448 05/16/23  1546   GLUCOSE RANDOM mg/dL 103 107     Results from last 7 days   Lab Units 05/16/23 2029 05/16/23  1836 05/16/23  1546   HS TNI 0HR ng/L  --   --  2   HS TNI 2HR ng/L  --  <2  --    HSTNI D2 ng/L  --  <0 --    HS TNI 4HR ng/L <2  --   --    HSTNI D4 ng/L <0  --   --              Results from last 7 days   Lab Units 05/16/23  1836   TSH 3RD GENERATON uIU/mL 0 681     ED Treatment:   Medication Administration from 05/16/2023 1539 to 05/17/2023 1007       Date/Time Order Dose Route Action     05/16/2023 1836 EDT sodium chloride 0 9 % bolus 1,000 mL 1,000 mL Intravenous New Bag     05/17/2023 0007 EDT nitrofurantoin (MACROBID) extended-release capsule 100 mg 100 mg Oral Given        Past Medical History:   Diagnosis Date   • Back pain    • Hashimoto's disease    • HIV (human immunodeficiency virus infection) ([facility] [address] )    • Urinary tract infection        Admitting Diagnosis: Palpitations [R00 2]  Age/Sex: 59 y o  female  Admission Orders:  Scheduled Medications:  famotidine, 40 mg, Oral, Daily  nitrofurantoin, 100 mg, Oral, HS      Continuous IV Infusions:     PRN Meds:  acetaminophen, 650 mg, Oral, Q6H PRN    Tele  Activity as mayo   OOB  Echo  IP CONSULT TO CARDIOLOGY    Network Utilization Review Department  ATTENTION: Please call with any questions or concerns to [phone] and carefully listen to the prompts so that you are directed to the right person  All voicemails are confidential   Shalomne Christian all requests for admission clinical reviews, approved or denied determinations and any other requests to dedicated fax number below belonging to the campus where the patient is receiving treatment   List of dedicated fax numbers for the Facilities:  [address] DENIALS (Administrative/Medical Necessity) [phone]   [address] [place] [address] (Maternity/NICU/Pediatrics) Deneen Biggs 172 [phone]   [facility] 77 [phone]   [facility] [address] [facility] [address] 310 [facility] 134 794 [facility] [phone]

## 2023-05-17 NOTE — ASSESSMENT & PLAN NOTE
Patient presenting to the ED for evaluation of chest palpitations  Reports symptoms have been intermittent over the last 2 days  · Noted on telemetry : Bigeminy/trigeminy  · ECG: NSR without acute ischemic changes  · Troponin level: 0hr 2, 2hr <2, 4hr <2  · Electrolytes within normal limits  · Noted history of Hashimoto's; TSH within normal limits  · Echo : BG00%, normal diastolic function  Normal wall motion  · Nuclear stress test :  · Cardiology on board

## 2023-05-17 NOTE — PROGRESS NOTES
[address] [place] [address]  Progress Note  Name: Lisa Blank  MRN: [id number]  Unit/Bed#: ED 21 I Date of Admission: 5/16/2023   Date of Service: 5/17/2023 I Hospital Day: 0    Assessment/Plan   * Palpitations  Assessment & Plan  Patient presenting to the ED for evaluation of chest palpitations  Reports symptoms have been intermittent over the last 2 days  · Noted on telemetry : Bigeminy/trigeminy  · ECG: NSR without acute ischemic changes  · Troponin level: 0hr 2, 2hr <2, 4hr <2  · Electrolytes within normal limits  · Noted history of Hashimoto's; TSH within normal limits  · Echo : OO17%, normal diastolic function  Normal wall motion  · Nuclear stress test :  · Cardiology on board  UTI (urinary tract infection)  Assessment & Plan  · Reports chronic UTI  · Follows w/outpatient urology and is on long-term PO macrobid course  · Continue 100mg PO macrobid qHS   · Continue outpatient f/u (reports having appt in June)    [facility]  Assessment & Plan  · Continue Odefsey (patient instructed to bring from home as is not on hospital formulary)    GERD (gastroesophageal reflux disease)  Assessment & Plan  · Continue daily Pepcid         VTE Pharmacologic Prophylaxis:   Pharmacologic: Pharmacologic VTE Prophylaxis contraindicated due to ambulating  Mechanical VTE Prophylaxis in Place: No    Patient Centered Rounds: I have performed bedside rounds with nursing staff today  Discussions with Specialists or Other Care Team Provider: cards    Education and Discussions with Family / Patient: dw patient    Time Spent for Care: 30 minutes  More than 50% of total time spent on counseling and coordination of care as described above      Current Length of Stay: 0 day(s)    Current Patient Status: Observation   Certification Statement: The patient will continue to require additional inpatient hospital stay due to needs nuclear stress     Discharge Plan: ?24 hrs    Code Status: Level 1 - Full Code      Subjective:   Reporting intermittent palpitations overnight    Objective:     Vitals:   No data recorded  HR:  [74-92] 92  Resp:  [11-30] 30  BP: (111-136)/(57-75) 129/75  SpO2:  [94 %-100 %] 97 %  Body mass index is 25 84 kg/m²  Input and Output Summary (last 24 hours): Intake/Output Summary (Last 24 hours) at 5/17/2023 1555  Last data filed at 5/16/2023 2225  Gross per 24 hour   Intake 1000 ml   Output --   Net 1000 ml       Physical Exam:     Physical Exam  Constitutional:       General: She is not in acute distress  Appearance: She is normal weight  She is not toxic-appearing  HENT:      Mouth/Throat:      Mouth: Mucous membranes are moist       Pharynx: Oropharynx is clear  Cardiovascular:      Rate and Rhythm: Normal rate and regular rhythm  Pulses: Normal pulses  Pulmonary:      Effort: Pulmonary effort is normal  No respiratory distress  Breath sounds: Normal breath sounds  Abdominal:      General: Abdomen is flat  Bowel sounds are normal       Palpations: Abdomen is soft  Neurological:      General: No focal deficit present  Mental Status: She is alert and oriented to person, place, and time           Additional Data:     Labs:    Results from last 7 days   Lab Units 05/17/23  0448 05/16/23  1546   WBC Thousand/uL 6 62 8 20   HEMOGLOBIN g/dL 12 2 13 6   HEMATOCRIT % 36 5 40 8   PLATELETS Thousands/uL 223 269   NEUTROS PCT %  --  55   LYMPHS PCT %  --  30   MONOS PCT %  --  10   EOS PCT %  --  4     Results from last 7 days   Lab Units 05/17/23  0448 05/16/23  1546   SODIUM mmol/L 140 136   POTASSIUM mmol/L 3 8 4 0   CHLORIDE mmol/L 109* 104   CO2 mmol/L 26 24   BUN mg/dL 12 16   CREATININE mg/dL 0 67 0 71   ANION GAP mmol/L 5 8   CALCIUM mg/dL 9 5 10 3*   ALBUMIN g/dL  --  4 7   TOTAL BILIRUBIN mg/dL  --  0 48   ALK PHOS U/L  --  151*   ALT U/L  --  14   AST U/L  --  14   GLUCOSE RANDOM mg/dL 103 107                       * I Have Reviewed All Lab Data Listed Above  * Additional Pertinent Lab Tests Reviewed: All Labs Within Last 24 Hours Reviewed      Recent Cultures (last 7 days):           Last 24 Hours Medication List:   Current Facility-Administered Medications   Medication Dose Route Frequency Provider Last Rate   • acetaminophen  650 mg Oral Q6H PRN Ayden Michele PA-C     • [place]Rk[place] AF  1 tablet Oral Daily Keira Dubon MD     • famotidine  40 mg Oral Daily Ayden Michele PA-C     • nitrofurantoin  100 mg Oral HS Evin Horn PA-C          Today, Patient Was Seen By: JOSE rTinidad      ** Please Note: Dictation voice to text software may have been used in the creation of this document   **

## 2023-05-17 NOTE — ED NOTES
Pt alert oriented x4  Spoke with cardiology via phone and discharge explained  AVS instructions given   Discharged ambulatory in no acute distress visitor at her side no c/o offered     Elizabeth Bruno RN  05/17/23 3050

## 2023-05-17 NOTE — PLAN OF CARE
Problem: Potential for Falls  Goal: Patient will remain free of falls  Description: INTERVENTIONS:  - Educate patient/family on patient safety including physical limitations  - Instruct patient to call for assistance with activity   - Consult OT/PT to assist with strengthening/mobility   - Keep Call bell within reach  - Keep bed low and locked with side rails adjusted as appropriate  - Keep care items and personal belongings within reach  - Initiate and maintain comfort rounds  - Make Fall Risk Sign visible to staff  - Offer Toileting every  Hours, in advance of need  - Initiate/Maintain alarm  - Obtain necessary fall risk management equipment:   - Apply yellow socks and bracelet for high fall risk patients  - Consider moving patient to room near nurses station  Outcome: Progressing     Problem: PAIN - ADULT  Goal: Verbalizes/displays adequate comfort level or baseline comfort level  Description: Interventions:  - Encourage patient to monitor pain and request assistance  - Assess pain using appropriate pain scale  - Administer analgesics based on type and severity of pain and evaluate response  - Implement non-pharmacological measures as appropriate and evaluate response  - Consider cultural and social influences on pain and pain management  - Notify physician/advanced practitioner if interventions unsuccessful or patient reports new pain  Outcome: Progressing     Problem: INFECTION - ADULT  Goal: Absence or prevention of progression during hospitalization  Description: INTERVENTIONS:  - Assess and monitor for signs and symptoms of infection  - Monitor lab/diagnostic results  - Monitor all insertion sites, i e  indwelling lines, tubes, and drains  - Monitor endotracheal if appropriate and nasal secretions for changes in amount and color  - [place] appropriate cooling/warming therapies per order  - Administer medications as ordered  - Instruct and encourage patient and family to use good hand hygiene technique  - Identify and instruct in appropriate isolation precautions for identified infection/condition  Outcome: Progressing  Goal: Absence of fever/infection during neutropenic period  Description: INTERVENTIONS:  - Monitor WBC    Outcome: Progressing     Problem: SAFETY ADULT  Goal: Patient will remain free of falls  Description: INTERVENTIONS:  - Educate patient/family on patient safety including physical limitations  - Instruct patient to call for assistance with activity   - Consult OT/PT to assist with strengthening/mobility   - Keep Call bell within reach  - Keep bed low and locked with side rails adjusted as appropriate  - Keep care items and personal belongings within reach  - Initiate and maintain comfort rounds  - Make Fall Risk Sign visible to staff  - Offer Toileting every  Hours, in advance of need  - Initiate/Maintain alarm  - Obtain necessary fall risk management equipment:   - Apply yellow socks and bracelet for high fall risk patients  - Consider moving patient to room near nurses station  Outcome: Progressing  Goal: Maintain or return to baseline ADL function  Description: INTERVENTIONS:  -  Assess patient's ability to carry out ADLs; assess patient's baseline for ADL function and identify physical deficits which impact ability to perform ADLs (bathing, care of mouth/teeth, toileting, grooming, dressing, etc )  - Assess/evaluate cause of self-care deficits   - Assess range of motion  - Assess patient's mobility; develop plan if impaired  - Assess patient's need for assistive devices and provide as appropriate  - Encourage maximum independence but intervene and supervise when necessary  - Involve family in performance of ADLs  - Assess for home care needs following discharge   - Consider OT consult to assist with ADL evaluation and planning for discharge  - Provide patient education as appropriate  Outcome: Progressing  Goal: Maintains/Returns to pre admission functional level  Description: INTERVENTIONS:  - Perform BMAT or MOVE assessment daily    - Set and communicate daily mobility goal to care team and patient/family/caregiver  - Collaborate with rehabilitation services on mobility goals if consulted  - Perform Range of Motion  times a day  - Reposition patient every  hours  - Dangle patient  times a day  - Stand patient  times a day  - Ambulate patient  times a day  - Out of bed to chair times a day   - Out of bed for meals  times a day  - Out of bed for toileting  - Record patient progress and toleration of activity level   Outcome: Progressing     Problem: DISCHARGE PLANNING  Goal: Discharge to home or other facility with appropriate resources  Description: INTERVENTIONS:  - Identify barriers to discharge w/patient and caregiver  - Arrange for needed discharge resources and transportation as appropriate  - Identify discharge learning needs (meds, wound care, etc )  - Arrange for interpretive services to assist at discharge as needed  - Refer to Case Management Department for coordinating discharge planning if the patient needs post-hospital services based on physician/advanced practitioner order or complex needs related to functional status, cognitive ability, or social support system  Outcome: Progressing     Problem: Knowledge Deficit  Goal: Patient/family/caregiver demonstrates understanding of disease process, treatment plan, medications, and discharge instructions  Description: Complete learning assessment and assess knowledge base    Interventions:  - Provide teaching at level of understanding  - Provide teaching via preferred learning methods  Outcome: Progressing

## 2023-05-17 NOTE — ASSESSMENT & PLAN NOTE
Patient presenting to the ED for evaluation of chest palpitations  Reports symptoms have been intermittent over the last 2 days  · Noted on telemetry : Bigeminy/trigeminy  · ECG: NSR without acute ischemic changes  · Troponin level: 0hr 2, 2hr <2, 4hr <2  · Electrolytes within normal limits  · Noted history of Hashimoto's; TSH within normal limits  · Echo : DH43%, normal diastolic function  Normal wall motion  · Nuclear stress test : WNL  · Cardiology on board   Started Toprol & Plan for OP Holter monitoring and fu

## 2023-05-17 NOTE — DISCHARGE INSTR - AVS FIRST PAGE
OP holter monitor in 3 weeks while on Toprol-XL  Call office- [phone] if still symptomatic prior to next appointment

## 2023-05-17 NOTE — ASSESSMENT & PLAN NOTE
Patient presenting to the ED for evaluation of chest palpitations  Reports symptoms have been intermittent over the last 2 days  Denies any associated dizziness, chest pain, SOB, abdominal pain, nausea or vomiting  · While in the ED, patient episodes of bigeminy and trigeminy   ED spoke to Cardiology who recommend admission for cardiac observation  · ECG: NSR without acute ischemic changes  · Troponin level: 0hr 2, 2hr <2, 4hr <2  · Keep potassium 4 or greater and magnesium 2 or greater  · Noted history of Hashimoto's; TSH within normal limits  · Check Echo  · Monitor on telemetry  · Consult to Cardiology, recommendations are appreciated

## 2023-05-17 NOTE — ASSESSMENT & PLAN NOTE
· Reports chronic UTI  · Follows w/outpatient urology and is on long-term PO macrobid course  · Continue 100mg PO macrobid qHS   · Continue outpatient f/u (reports having appt in June)

## 2023-05-17 NOTE — CONSULTS
Consultation - Cardiology   Cristina Meeks 59 y o  female MRN: [id number]  Unit/Bed#: ED 21 Encounter: [id number]  05/17/23  8:29 AM    Assessment/ Plan:  1  Palpitations  • Patient endorses 2 days of palpitations  • Sinus rhythm with PVCs noted on telemetry and on ecg  • Continue telemetry monitoring  • Recommend holter monitor on discharge to evaluate PVC burden  • Exercise nuclear stress test ordered to evaluate for ischemia  2  UTI  • On chronic macrobid    3  GERD    4  HIV- on Odefsey        History of Present Illness   Physician Requesting Consult: Susana Richey MD    Reason for Consult / Principal Problem: Palpitations    HPI: Cristina Meeks is a 59y o  year old female with PMH of GERD, HIV, and hashimoto's who presents with palpitations  She notes that she has feeling skipped beats for 2 days and now they sustain for longer periods of time  She notes that she felt palpitations most of the night  She denies any chest pain, shortness of breath, or lower extremity edema  She denies any cardiac history of her parents  She notes that her sister was recent diagnosed with CHF  Further evaluation in the ED shows that she is in sinus rhythm with frequent PVCs  Inpatient consult to Cardiology  Consult performed by: KESHAWN Lorenzana  Consult ordered by: Jon Eastman PA-C          EKG: Sinus rhythm with frequent premature ventricular complexes      Review of Systems   Constitutional: Negative for chills, diaphoresis and fever  Respiratory: Negative for cough, chest tightness and shortness of breath  Cardiovascular: Positive for palpitations  Negative for chest pain and leg swelling  Gastrointestinal: Negative for abdominal distention, blood in stool, nausea and vomiting  Genitourinary: Negative for difficulty urinating  Musculoskeletal: Negative for arthralgias and back pain  Neurological: Negative for dizziness, syncope, light-headedness and headaches     Psychiatric/Behavioral: Negative for agitation and confusion  The patient is not nervous/anxious  Historical Information   Past Medical History:   Diagnosis Date   • Back pain    • Hashimoto's disease    • HIV (human immunodeficiency virus infection) ([facility] [address] )    • Urinary tract infection      Past Surgical History:   Procedure Laterality Date   • CHOLECYSTECTOMY       Social History     Substance and Sexual Activity   Alcohol Use Not Currently     Social History     Substance and Sexual Activity   Drug Use Never     Social History     Tobacco Use   Smoking Status Never   Smokeless Tobacco Never       Family History: No family history on file  Meds/Allergies   all current active meds have been reviewed and current meds:   Current Facility-Administered Medications   Medication Dose Route Frequency   • acetaminophen (TYLENOL) tablet 650 mg  650 mg Oral Q6H PRN   • Mxafwhnysb-Fvamfpiz-Zdyzxnb AF (ODEFSEY) tablet 1 tablet  1 tablet Oral Daily   • famotidine (PEPCID) tablet 40 mg  40 mg Oral Daily   • nitrofurantoin (MACROBID) extended-release capsule 100 mg  100 mg Oral HS     Allergies   Allergen Reactions   • Sesame Oil - Food Allergy    • Sulfa Antibiotics Rash       Objective   Vitals: Blood pressure 119/57, pulse 90, temperature 98 °F (36 7 °C), temperature source Temporal, resp  rate 19, SpO2 96 %  , There is no height or weight on file to calculate BMI ,   Orthostatic Blood Pressures    Flowsheet Row Most Recent Value   Blood Pressure 119/57 filed at 2023 0600   Patient Position - Orthostatic VS Lying filed at 2023 4925          Systolic (48PIA), LTH:183 , Min:118 , LNP:618     Diastolic (36JGR), EQ, Min:57, Max:110        Intake/Output Summary (Last 24 hours) at 2023 0829  Last data filed at 2023 2225  Gross per 24 hour   Intake 1000 ml   Output --   Net 1000 ml       Invasive Devices     Peripheral Intravenous Line  Duration           Peripheral IV 23 Left;Proximal;Ventral (anterior) Antecubital <1 day Physical Exam:  Physical Exam  Vitals and nursing note reviewed  Constitutional:       General: She is not in acute distress  Appearance: She is well-developed  HENT:      Head: Normocephalic and atraumatic  Eyes:      Conjunctiva/sclera: Conjunctivae normal    Neck:      Vascular: No carotid bruit  Cardiovascular:      Rate and Rhythm: Normal rate and regular rhythm  Heart sounds: Normal heart sounds  No murmur heard  Pulmonary:      Effort: Pulmonary effort is normal  No respiratory distress  Breath sounds: Normal breath sounds  Abdominal:      Palpations: Abdomen is soft  Tenderness: There is no abdominal tenderness  Musculoskeletal:         General: No swelling  Cervical back: Neck supple  Right lower leg: No edema  Left lower leg: No edema  Skin:     General: Skin is warm and dry  Capillary Refill: Capillary refill takes less than 2 seconds  Neurological:      Mental Status: She is alert and oriented to person, place, and time     Psychiatric:         Mood and Affect: Mood normal           Lab Results:     Cardiac Profile:   Results from last 7 days   Lab Units 05/16/23 2029 05/16/23  1836 05/16/23  1546   HS TNI 0HR ng/L  --   --  2   HS TNI 2HR ng/L  --  <2  --    HSTNI D2 ng/L  --  <0  --    HS TNI 4HR ng/L <2  --   --    HSTNI D4 ng/L <0  --   --        CBC with diff:   Results from last 7 days   Lab Units 05/17/23  0448 05/16/23  1546   WBC Thousand/uL 6 62 8 20   HEMOGLOBIN g/dL 12 2 13 6   HEMATOCRIT % 36 5 40 8   MCV fL 89 89   PLATELETS Thousands/uL 223 269   MCH pg 29 7 29 7   MCHC g/dL 33 4 33 3   RDW % 13 5 13 6   MPV fL 10 1 10 0   NRBC AUTO /100 WBCs  --  0         CMP:   Results from last 7 days   Lab Units 05/17/23  0448 05/16/23  1546   POTASSIUM mmol/L 3 8 4 0   CHLORIDE mmol/L 109* 104   CO2 mmol/L 26 24   BUN mg/dL 12 16   CREATININE mg/dL 0 67 0 71   CALCIUM mg/dL 9 5 10 3*   AST U/L  --  14   ALT U/L  --  14   ALK PHOS U/L --  151*   EGFR ml/min/1 73sq m 95 92

## 2023-05-17 NOTE — DISCHARGE SUMMARY
[address]  Discharge- James Mcneal 1965, 59 y o  female MRN: [id number]  Unit/Bed#: ED 21 Encounter: [id number]  Primary Care Provider: No primary care provider on file  Date and time admitted to hospital: 5/16/2023  4:53 PM    * Palpitations  Assessment & Plan  Patient presenting to the ED for evaluation of chest palpitations  Reports symptoms have been intermittent over the last 2 days  · Noted on telemetry : Bigeminy/trigeminy  · ECG: NSR without acute ischemic changes  · Troponin level: 0hr 2, 2hr <2, 4hr <2  · Electrolytes within normal limits  · Noted history of Hashimoto's; TSH within normal limits  · Echo : HZ76%, normal diastolic function  Normal wall motion  · Nuclear stress test : WNL  · Cardiology on board  Started Toprol & Plan for OP Holter monitoring and fu    UTI (urinary tract infection)  Assessment & Plan  · Reports chronic UTI  · Follows w/outpatient urology and is on long-term PO macrobid course  · Continue 100mg PO macrobid qHS   · Continue outpatient f/u (reports having appt in June)    HIV (human immunodeficiency virus infection) ([facility] [address] )  Assessment & Plan  · Continue Odefsey (patient instructed to bring from home as is not on hospital formulary)    GERD (gastroesophageal reflux disease)  Assessment & Plan  · Continue daily Pepcid    Discharging Physician / Practitioner: Donna Garza MD  PCP: No primary care provider on file  Admission Date:   Admission Orders (From admission, onward)     Ordered        05/16/23 2238  Place in Observation  Once                      Discharge Date: 05/17/23    Disposition:      · home    Reason for Admission: palpitations    Discharge Diagnoses:     Please see assessment and plan section above for further details regarding discharge diagnoses       Medical Problems     Resolved Problems  Date Reviewed: 5/16/2023   None         Consultations During Hospital Stay:  · cardiology    Medication Adjustments and Discharge "Medications:  · Summary of Medication Adjustments made as a result of this hospitalization: see med rec  · Medication Dosing Tapers - Please refer to Discharge Medication List for details on any medication dosing tapers (if applicable to patient)  · Medications being temporarily held (include recommended restart time): see med rec  · Discharge Medication List: See after visit summary for reconciled discharge medications  Diet Recommendations at Discharge:  Diet -        Diet Orders   (From admission, onward)             Start     Ordered    05/16/23 2239  Diet Regular; Regular House  Diet effective now        References:    Adult Nutrition Support Algorithm    RD Therapeutic Diet Order Protocol   Question Answer Comment   Diet Type Regular    Regular Regular House    RD to adjust diet per protocol? Yes        05/16/23 2238                Outpatient Tests Requested:  · OP holter monitoring      Hospital Course:     Rock Jalloh is a 59 y o  female patient who originally presented to the hospital on 5/16/2023 with no underlying history of cardiac disease presents to the ED for evaluation of chest palpitations  Reports symptoms have been intermittent over the last 2 days  On telemetry noted to have sinus rhythm with PVCs  Echo/stress test within normal limits  Electrolytes within normal limits  TSH within normal limits and troponins negative  Discussed with cardiology, started patient on Toprol with plan for outpatient Holter monitoring and outpatient follow-up    DC home    Condition at Discharge: fair     Discharge Day Visit / Exam:     Vitals: Blood Pressure: 129/75 (05/17/23 1533)  Pulse: 92 (05/17/23 1533)  Temperature: 98 °F (36 7 °C) (05/16/23 1542)  Temp Source: Temporal (05/16/23 1542)  Respirations: (!) 30 (05/17/23 1533)  Height: 5' 7\" (170 2 cm) (05/17/23 1159)  Weight - Scale: 74 8 kg (165 lb) (05/17/23 1159)  SpO2: 97 % (05/17/23 1533)  Exam:   Physical Exam  Constitutional:       General: " She is not in acute distress  Appearance: She is normal weight  She is not toxic-appearing  HENT:      Mouth/Throat:      Mouth: Mucous membranes are moist    Cardiovascular:      Rate and Rhythm: Normal rate and regular rhythm  Pulses: Normal pulses  Pulmonary:      Effort: Pulmonary effort is normal  No respiratory distress  Breath sounds: Normal breath sounds  Abdominal:      General: Abdomen is flat  Bowel sounds are normal       Palpations: Abdomen is soft  Musculoskeletal:      Right lower leg: No edema  Left lower leg: No edema  Neurological:      General: No focal deficit present  Mental Status: She is alert and oriented to person, place, and time  Discussion with Family:     Goals of Care Discussions:  · Code Status at Discharge: Level 1 - Full Code    Discharge instructions/Information to patient and family:   See after visit summary section titled Discharge Instructions for information provided to patient and family  Discharge Statement:  I spent 40 minutes discharging the patient  This time was spent on the day of discharge  I had direct contact with the patient on the day of discharge  Greater than 50% of the total time was spent examining patient, answering all patient questions, arranging and discussing plan of care with patient as well as directly providing post-discharge instructions  Additional time then spent on discharge activities      ** Please Note: This note has been constructed using a voice recognition system **

## 2023-05-18 ENCOUNTER — TELEPHONE (OUTPATIENT)
Dept: CARDIOLOGY CLINIC | Facility: CLINIC | Age: 58
End: 2023-05-18

## 2023-05-18 LAB
ARRHY DURING EX: NORMAL
CHEST PAIN STATEMENT: NORMAL
MAX DIASTOLIC BP: 56 MMHG
MAX HEART RATE: 166 BPM
MAX PREDICTED HEART RATE: 160 BPM
MAX. SYSTOLIC BP: 142 MMHG
PROTOCOL NAME: NORMAL
REASON FOR TERMINATION: NORMAL
TARGET HR FORMULA: NORMAL
TEST INDICATION: NORMAL
TIME IN EXERCISE PHASE: NORMAL

## 2023-05-18 NOTE — TELEPHONE ENCOUNTER
----- Message from Gudelia Wharton, Aubree Winnie Edwards sent at 5/17/2023  4:22 PM EDT -----  Regarding: Hospital Follow-up  Cardiology Follow-up:    Patient clinical visit in 1 month at the [address] [place] [address] location: [facility] office       Schedule visit with Cardio Tee Providers: [employer] or first available provider  Type of Visit: VISIT TYPE: in-person office visit  Test ordered: Cardiac tests: holter monitor      Additional Details: 24 holter monitor ordered

## 2023-06-13 ENCOUNTER — OFFICE VISIT (OUTPATIENT)
Dept: CARDIOLOGY CLINIC | Facility: CLINIC | Age: 58
End: 2023-06-13
Payer: COMMERCIAL

## 2023-06-13 VITALS
RESPIRATION RATE: 16 BRPM | DIASTOLIC BLOOD PRESSURE: 68 MMHG | BODY MASS INDEX: 26.21 KG/M2 | SYSTOLIC BLOOD PRESSURE: 124 MMHG | WEIGHT: 167 LBS | HEIGHT: 67 IN | HEART RATE: 64 BPM | OXYGEN SATURATION: 98 %

## 2023-06-13 DIAGNOSIS — B20 HIV INFECTION, UNSPECIFIED SYMPTOM STATUS (HCC): ICD-10-CM

## 2023-06-13 DIAGNOSIS — R00.2 PALPITATIONS: Primary | ICD-10-CM

## 2023-06-13 PROCEDURE — 99213 OFFICE O/P EST LOW 20 MIN: CPT | Performed by: INTERNAL MEDICINE

## 2023-06-13 RX ORDER — EMTRICITABINE, RILPIVIRINE, AND TENOFOVIR ALAFENAMIDE 200; 25; 25 MG/1; MG/1; MG/1
TABLET ORAL
COMMUNITY
Start: 2023-05-02

## 2023-06-13 RX ORDER — METOPROLOL SUCCINATE 25 MG/1
25 TABLET, EXTENDED RELEASE ORAL DAILY
Qty: 30 TABLET | Refills: 3 | Status: SHIPPED | OUTPATIENT
Start: 2023-06-13 | End: 2023-07-13

## 2023-06-13 RX ORDER — ESTRADIOL 10 UG/1
1 INSERT VAGINAL 2 TIMES WEEKLY
COMMUNITY
Start: 2023-05-07

## 2023-06-13 RX ORDER — METOPROLOL SUCCINATE 25 MG/1
25 TABLET, EXTENDED RELEASE ORAL DAILY
Qty: 30 TABLET | Refills: 0 | Status: SHIPPED | OUTPATIENT
Start: 2023-06-13 | End: 2023-06-13

## 2023-06-13 NOTE — PROGRESS NOTES
Cardiology Follow Up    Finn Leyva  1965  [id number]  [facility] [address] [id number] [phone]    1  Palpitations  -     metoprolol succinate (TOPROL-XL) 25 mg 24 hr tablet; Take 1 tablet (25 mg total) by mouth daily    2  HIV infection, unspecified symptom status ([place] [address] )          Interval History: Very pleasant lady who had noted PVCs  She was hospitalized  She had an echo and stress test that were normal   Holter monitor is pending  She was placed on Toprol XL and it seems to be helping  Patient Active Problem List   Diagnosis   • Palpitations   • HIV (human immunodeficiency virus infection) ([place] [address] )   • GERD (gastroesophageal reflux disease)   • UTI (urinary tract infection)     Past Medical History:   Diagnosis Date   • Back pain    • Hashimoto's disease    • HIV (human immunodeficiency virus infection) ([place] [address] )    • Urinary tract infection      Social History     Socioeconomic History   • Marital status: /Civil Union     Spouse name: Not on file   • Number of children: Not on file   • Years of education: Not on file   • Highest education level: Not on file   Occupational History   • Not on file   Tobacco Use   • Smoking status: Never   • Smokeless tobacco: Never   Substance and Sexual Activity   • Alcohol use: Not Currently   • Drug use: Never   • Sexual activity: Not on file   Other Topics Concern   • Not on file   Social History Narrative   • Not on file     Social Determinants of Health     Financial Resource Strain: Not on file   Food Insecurity: Not on file   Transportation Needs: Not on file   Physical Activity: Not on file   Stress: Not on file   Social Connections: Not on file   Intimate Partner Violence: Not on file   Housing Stability: Not on file      History reviewed  No pertinent family history    Past Surgical History:   Procedure Laterality Date   • CHOLECYSTECTOMY         Current Outpatient Medications:   •  Iaarsubgby-Wyovtrvq-Uigvxpq AF (ODEFSEY PO), Take by mouth, Disp: , Rfl:   •  estradiol (VAGIFEM, YUVAFEM) 10 MCG TABS vaginal tablet, Insert 1 tablet into the vagina 2 (two) times a week, Disp: , Rfl:   •  famotidine (PEPCID) 40 MG tablet, Take 40 mg by mouth daily, Disp: , Rfl:   •  metoprolol succinate (TOPROL-XL) 25 mg 24 hr tablet, Take 1 tablet (25 mg total) by mouth daily, Disp: 30 tablet, Rfl: 3  •  nitrofurantoin (MACROBID) 100 mg capsule, Take 1 capsule (100 mg total) by mouth daily at bedtime, Disp: , Rfl: 0  •  Odefsey tablet, , Disp: , Rfl:   Allergies   Allergen Reactions   • Sesame Oil - Food Allergy    • Sulfa Antibiotics Rash       Labs:  Admission on 05/16/2023, Discharged on 05/17/2023   Component Date Value   • WBC 05/16/2023 8 20    • RBC 05/16/2023 4 58    • Hemoglobin 05/16/2023 13 6    • Hematocrit 05/16/2023 40 8    • MCV 05/16/2023 89    • MCH 05/16/2023 29 7    • MCHC 05/16/2023 33 3    • RDW 05/16/2023 13 6    • MPV 05/16/2023 10 0    • Platelets 53/38/6664 269    • nRBC 05/16/2023 0    • Neutrophils Relative 05/16/2023 55    • Immat GRANS % 05/16/2023 0    • Lymphocytes Relative 05/16/2023 30    • Monocytes Relative 05/16/2023 10    • Eosinophils Relative 05/16/2023 4    • Basophils Relative 05/16/2023 1    • Neutrophils Absolute 05/16/2023 4 45    • Immature Grans Absolute 05/16/2023 0 01    • Lymphocytes Absolute 05/16/2023 2 47    • Monocytes Absolute 05/16/2023 0 82    • Eosinophils Absolute 05/16/2023 0 36    • Basophils Absolute 05/16/2023 0 09    • Sodium 05/16/2023 136    • Potassium 05/16/2023 4 0    • Chloride 05/16/2023 104    • CO2 05/16/2023 24    • ANION GAP 05/16/2023 8    • BUN 05/16/2023 16    • Creatinine 05/16/2023 0 71    • Glucose 05/16/2023 107    • Calcium 05/16/2023 10 3 (H)    • AST 05/16/2023 14    • ALT 05/16/2023 14    • Alkaline Phosphatase 05/16/2023 151 (H)    • Total Protein 05/16/2023 8 4    • Albumin 05/16/2023 4 7    • Total Bilirubin 05/16/2023 0 48    • eGFR 05/16/2023 92    • hs TnI 0hr 05/16/2023 2    • Ventricular Rate 05/16/2023 91    • Atrial Rate 05/16/2023 91    • ME Interval 05/16/2023 142    • QRSD Interval 05/16/2023 86    • QT Interval 05/16/2023 366    • QTC Interval 05/16/2023 450    • P Axis 05/16/2023 77    • QRS Axis 05/16/2023 76    • T Wave Axis 05/16/2023 54    • hs TnI 2hr 05/16/2023 <2    • Delta 2hr hsTnI 05/16/2023 <0    • hs TnI 4hr 05/16/2023 <2    • Delta 4hr hsTnI 05/16/2023 <0    • TSH 3RD GENERATON 05/16/2023 0 681    • Magnesium 05/16/2023 2 1    • Ventricular Rate 05/16/2023 77    • Atrial Rate 05/16/2023 77    • ME Interval 05/16/2023 162    • QRSD Interval 05/16/2023 88    • QT Interval 05/16/2023 390    • QTC Interval 05/16/2023 441    • P Axis 05/16/2023 79    • QRS Axis 05/16/2023 68    • T Wave Axis 05/16/2023 48    • LA size 05/17/2023 2 8    • Triscuspid Valve Regurgi* 05/17/2023 13 0    • Tricuspid valve peak reg* 05/17/2023 1 80    • LVPWd 05/17/2023 0 70    • Left Atrium Area-systoli* 05/17/2023 13 4    • Left Atrium Area-systoli* 05/17/2023 12 3    • MV E' Tissue Velocity Se* 05/17/2023 10    • Tricuspid annular plane * 05/17/2023 2 30    • TR Peak Franc 05/17/2023 1 8    • IVSd 05/17/2023 8 75    • LV DIASTOLIC VOLUME (MOD* 14/32/3373 106    • LEFT VENTRICLE SYSTOLIC * 92/00/8014 44    • Left ventricular stroke * 05/17/2023 62 00    • A4C EF 05/17/2023 70    • LA length (A2C) 05/17/2023 5 00    • LVIDd 05/17/2023 4 80    • IVS 05/17/2023 0 8    • LVIDS 05/17/2023 3 30    • FS 05/17/2023 31    • Asc Ao 05/17/2023 3 1    • Ao root 05/17/2023 3 00    • RVID d 05/17/2023 3 0    • MV valve area p 1/2 meth* 05/17/2023 5 00    • E wave deceleration time 05/17/2023 153    • MV Peak E Franc 05/17/2023 56    • MV Peak A Franc 05/17/2023 0 56    • ISAIAS A4C 05/17/2023 12 7    • RAA A4C 05/17/2023 11 4    • MV stenosis pressure 1/2* 05/17/2023 44    • LVSV, 2D 05/17/2023 62 • Ventricular Rate 05/17/2023 89    • Atrial Rate 05/17/2023 89    • KS Interval 05/17/2023 174    • QRSD Interval 05/17/2023 90    • QT Interval 05/17/2023 366    • QTC Interval 05/17/2023 445    • P Axis 05/17/2023 78    • QRS Axis 05/17/2023 64    • T Wave Axis 05/17/2023 54    • Sodium 05/17/2023 140    • Potassium 05/17/2023 3 8    • Chloride 05/17/2023 109 (H)    • CO2 05/17/2023 26    • ANION GAP 05/17/2023 5    • BUN 05/17/2023 12    • Creatinine 05/17/2023 0 67    • Glucose 05/17/2023 103    • Calcium 05/17/2023 9 5    • eGFR 05/17/2023 95    • WBC 05/17/2023 6 62    • RBC 05/17/2023 4 11    • Hemoglobin 05/17/2023 12 2    • Hematocrit 05/17/2023 36 5    • MCV 05/17/2023 89    • MCH 05/17/2023 29 7    • MCHC 05/17/2023 33 4    • RDW 05/17/2023 13 5    • Platelets 01/63/2075 223    • MPV 05/17/2023 10 1    • Rest Nuclear Isotope Dose 05/17/2023 10 70    • Stress Nuclear Isotope D* 05/17/2023 29 00    • Baseline HR 05/17/2023 83    • Baseline BP 05/17/2023 128/72    • O2 sat rest 05/17/2023 98    • Stress peak HR 05/17/2023 166    • Post peak BP 05/17/2023 142    • Rate Pressure Product 05/17/2023 23,572 0    • O2 sat peak 05/17/2023 98    • Recovery HR 05/17/2023 100    • Recovery BP 05/17/2023 116/64    • Max HR 05/17/2023 166    • Max HR Percent 05/17/2023 103    • Exercise duration (min) 05/17/2023 6    • Exercise duration (sec) 05/17/2023 0    • Estimated workload 05/17/2023 7 0    • Stress Stage Reached 05/17/2023 2 0    • Protocol Name 05/17/2023 CHANEL    • Time In Exercise Phase 05/17/2023 00:06:00    • MAX   SYSTOLIC BP 81/51/6846 870    • Max Diastolic Bp 66/77/7953 56    • Max Heart Rate 05/17/2023 166    • Max Predicted Heart Rate 05/17/2023 160    • Reason for Termination 05/17/2023 Maximal exercise (symptom limited), SOB, FATIGUE    • Test Indication 05/17/2023 PALPITAIONS, BIGEMINY    • Target Hr Formular 05/17/2023 (220 - Age)*85%    • Arrhy During Ex 05/17/2023 ventricular premature beats • Chest Pain Statement 05/17/2023 none      Imaging: Stress strip    Result Date: 5/18/2023  Narrative: Confirmed by Janelle Cisneros ([id number]),  Maggi Laws ([id number]) on 5/18/2023 8:26:50 AM    NM myocardial perfusion spect (stress and/or rest)    Result Date: 5/17/2023  Narrative: •  Stress ECG: The stress ECG is negative for ischemia after maximal exercise, without reproduction of symptoms  •  Perfusion: There are no perfusion defects  •  Stress Function: Left ventricular function post-stress is normal  •  Stress Combined Conclusion: The ECG and SPECT imaging portions of the stress study are concordant with no evidence of stress induced myocardial ischemia  Echo complete w/ contrast if indicated    Result Date: 5/17/2023  Narrative: •  Left Ventricle: Left ventricular cavity size is normal  Wall thickness is normal  Systolic function is normal (60%)  Wall motion is normal  Diastolic function is normal  •  Right Ventricle: Right ventricular cavity size is normal  Systolic function is normal  •  Mitral Valve: There is trace regurgitation  •  Tricuspid Valve: There is trace regurgitation  The right ventricular systolic pressure is normal      XR chest 2 views    Result Date: 5/17/2023  Narrative: CHEST INDICATION:   Chest Pain  COMPARISON: 9/28/2022 EXAM PERFORMED/VIEWS:  XR CHEST PA & LATERAL Images: 4 FINDINGS: Cardiomediastinal silhouette appears unremarkable  The lungs are clear  No pneumothorax or pleural effusion  Osseous structures appear within normal limits for patient age  Impression: No acute cardiopulmonary disease  Findings are stable Workstation performed: [id number]       Review of Systems:  Review of Systems    Physical Exam:  124/68  Heart rate 66 and regular  Lungs clear  Regular rhythm  No murmurs or gallops  Discussion/Summary:    1  Probable benign PVCs    Recommendations:    1  Stop Toprol before the Holter is done  2    Return after Connie Medina MD

## 2023-06-20 ENCOUNTER — HOSPITAL ENCOUNTER (OUTPATIENT)
Dept: NON INVASIVE DIAGNOSTICS | Facility: CLINIC | Age: 58
Discharge: HOME | End: 2023-06-20
Payer: COMMERCIAL

## 2023-06-20 DIAGNOSIS — R00.2 PALPITATIONS: ICD-10-CM

## 2023-06-20 PROCEDURE — 93226 XTRNL ECG REC<48 HR SCAN A/R: CPT

## 2023-06-20 PROCEDURE — 93225 XTRNL ECG REC<48 HRS REC: CPT

## 2023-06-28 PROCEDURE — 93227 XTRNL ECG REC<48 HR R&I: CPT | Performed by: INTERNAL MEDICINE

## 2023-07-16 PROBLEM — N39.0 UTI (URINARY TRACT INFECTION): Status: RESOLVED | Noted: 2023-05-16 | Resolved: 2023-07-16

## 2023-08-07 ENCOUNTER — OFFICE VISIT (OUTPATIENT)
Dept: CARDIOLOGY CLINIC | Facility: CLINIC | Age: 58
End: 2023-08-07
Payer: COMMERCIAL

## 2023-08-07 VITALS
OXYGEN SATURATION: 98 % | WEIGHT: 170 LBS | BODY MASS INDEX: 26.68 KG/M2 | HEIGHT: 67 IN | HEART RATE: 78 BPM | SYSTOLIC BLOOD PRESSURE: 108 MMHG | DIASTOLIC BLOOD PRESSURE: 60 MMHG | RESPIRATION RATE: 16 BRPM

## 2023-08-07 DIAGNOSIS — R00.2 PALPITATIONS: Primary | ICD-10-CM

## 2023-08-07 PROCEDURE — 99213 OFFICE O/P EST LOW 20 MIN: CPT | Performed by: INTERNAL MEDICINE

## 2023-08-07 RX ORDER — CLOBETASOL PROPIONATE 0.46 MG/ML
SOLUTION TOPICAL
COMMUNITY
Start: 2023-06-20

## 2023-08-07 RX ORDER — KETOCONAZOLE 20 MG/ML
SHAMPOO TOPICAL
COMMUNITY
Start: 2023-06-20

## 2023-08-07 RX ORDER — MINOXIDIL 2.5 MG/1
TABLET ORAL
COMMUNITY
Start: 2023-06-20

## 2023-08-07 RX ORDER — METOPROLOL SUCCINATE 25 MG/1
25 TABLET, EXTENDED RELEASE ORAL DAILY
Qty: 30 TABLET | Refills: 3 | Status: SHIPPED | OUTPATIENT
Start: 2023-08-07 | End: 2023-09-06

## 2023-08-07 NOTE — PROGRESS NOTES
Cardiology Follow Up    Dinesh Amezcua  1965  [id number]  [address] [place] [address] [facility] Anika Castillo [address] [place] [address]  [place] [address] [phone]    1. Palpitations  -     metoprolol succinate (TOPROL-XL) 25 mg 24 hr tablet; Take 1 tablet (25 mg total) by mouth daily          Interval History: Patient was seen in the hospital because of "rapid heartbeat."  Thyroid studies were normal and she had a mild sinus tachycardia which came under control with Toprol at a low dose. She also had unifocal PVCs. Stress test and echocardiograms were normal.    She complains that for 3 to 4 days at the time she gets fast heartbeat. It is similar to what she had in the hospital.    She works from home and gets little or no exercise.     Patient Active Problem List   Diagnosis   • Palpitations   • HIV infection ([facility])   • GERD (gastroesophageal reflux disease)     Past Medical History:   Diagnosis Date   • Back pain    • Hashimoto's disease    • HIV (human immunodeficiency virus infection) ([facility])    • Urinary tract infection      Social History     Socioeconomic History   • Marital status: /Civil Union     Spouse name: Not on file   • Number of children: Not on file   • Years of education: Not on file   • Highest education level: Not on file   Occupational History   • Not on file   Tobacco Use   • Smoking status: Never   • Smokeless tobacco: Never   Substance and Sexual Activity   • Alcohol use: Not Currently   • Drug use: Never   • Sexual activity: Not on file   Other Topics Concern   • Not on file   Social History Narrative   • Not on file     Social Determinants of Health     Financial Resource Strain: Not on file   Food Insecurity: Not on file   Transportation Needs: Not on file   Physical Activity: Not on file   Stress: Not on file   Social Connections: Not on file   Intimate Partner Violence: Not on file   Housing Stability: Not on file History reviewed. No pertinent family history. Past Surgical History:   Procedure Laterality Date   • CHOLECYSTECTOMY         Current Outpatient Medications:   •  clobetasol (TEMOVATE) 0.05 % external solution, APPLY A THIN LAYER TWICE A DAY FOR 2 WEEKS if needed TO ITCHY/FLAKY SKIN ON SCALP, Disp: , Rfl:   •  Zsfpaxzxvt-Vonfjtfm-Datekpf AF (ODEFSEY PO), Take by mouth, Disp: , Rfl:   •  estradiol (VAGIFEM, YUVAFEM) 10 MCG TABS vaginal tablet, Insert 1 tablet into the vagina 2 (two) times a week, Disp: , Rfl:   •  famotidine (PEPCID) 40 MG tablet, Take 40 mg by mouth daily, Disp: , Rfl:   •  ketoconazole (NIZORAL) 2 % shampoo, apply daily three times a week to FLAKY RASH ON SCALP, LEAVE 5 TO 10 MINUTES BEFORE WASHING, Disp: , Rfl:   •  metoprolol succinate (TOPROL-XL) 25 mg 24 hr tablet, Take 1 tablet (25 mg total) by mouth daily, Disp: 30 tablet, Rfl: 3  •  minoxidil (LONITEN) 2.5 mg tablet, take 1/2 tablet by mouth daily for HAIR LOSS, Disp: , Rfl:   •  nitrofurantoin (MACROBID) 100 mg capsule, Take 1 capsule (100 mg total) by mouth daily at bedtime (Patient not taking: Reported on 8/7/2023), Disp: , Rfl: 0  •  Odefsey tablet, , Disp: , Rfl:   Allergies   Allergen Reactions   • Sesame Oil - Food Allergy    • Sulfa Antibiotics Rash       Labs:  No visits with results within 2 Month(s) from this visit.    Latest known visit with results is:   Admission on 05/16/2023, Discharged on 05/17/2023   Component Date Value   • WBC 05/16/2023 8.20    • RBC 05/16/2023 4.58    • Hemoglobin 05/16/2023 13.6    • Hematocrit 05/16/2023 40.8    • MCV 05/16/2023 89    • MCH 05/16/2023 29.7    • MCHC 05/16/2023 33.3    • RDW 05/16/2023 13.6    • MPV 05/16/2023 10.0    • Platelets 78/98/3647 269    • nRBC 05/16/2023 0    • Neutrophils Relative 05/16/2023 55    • Immat GRANS % 05/16/2023 0    • Lymphocytes Relative 05/16/2023 30    • Monocytes Relative 05/16/2023 10    • Eosinophils Relative 05/16/2023 4    • Basophils Relative 05/16/2023 1    • Neutrophils Absolute 05/16/2023 4.45    • Immature Grans Absolute 05/16/2023 0.01    • Lymphocytes Absolute 05/16/2023 2.47    • Monocytes Absolute 05/16/2023 0.82    • Eosinophils Absolute 05/16/2023 0.36    • Basophils Absolute 05/16/2023 0.09    • Sodium 05/16/2023 136    • Potassium 05/16/2023 4.0    • Chloride 05/16/2023 104    • CO2 05/16/2023 24    • ANION GAP 05/16/2023 8    • BUN 05/16/2023 16    • Creatinine 05/16/2023 0.71    • Glucose 05/16/2023 107    • Calcium 05/16/2023 10.3 (H)    • AST 05/16/2023 14    • ALT 05/16/2023 14    • Alkaline Phosphatase 05/16/2023 151 (H)    • Total Protein 05/16/2023 8.4    • Albumin 05/16/2023 4.7    • Total Bilirubin 05/16/2023 0.48    • eGFR 05/16/2023 92    • hs TnI 0hr 05/16/2023 2    • Ventricular Rate 05/16/2023 91    • Atrial Rate 05/16/2023 91    • KS Interval 05/16/2023 142    • QRSD Interval 05/16/2023 86    • QT Interval 05/16/2023 366    • QTC Interval 05/16/2023 450    • P Axis 05/16/2023 77    • QRS Axis 05/16/2023 76    • T Wave Axis 05/16/2023 54    • hs TnI 2hr 05/16/2023 <2    • Delta 2hr hsTnI 05/16/2023 <0    • hs TnI 4hr 05/16/2023 <2    • Delta 4hr hsTnI 05/16/2023 <0    • TSH 3RD GENERATON 05/16/2023 0.681    • Magnesium 05/16/2023 2.1    • Ventricular Rate 05/16/2023 77    • Atrial Rate 05/16/2023 77    • KS Interval 05/16/2023 162    • QRSD Interval 05/16/2023 88    • QT Interval 05/16/2023 390    • QTC Interval 05/16/2023 441    • P Axis 05/16/2023 79    • QRS Axis 05/16/2023 68    • T Wave Axis 05/16/2023 48    • LA size 05/17/2023 2.8    • Triscuspid Valve Regurgi* 05/17/2023 13.0    • Tricuspid valve peak reg* 05/17/2023 1.80    • LVPWd 05/17/2023 0.70    • Left Atrium Area-systoli* 05/17/2023 13.4    • Left Atrium Area-systoli* 05/17/2023 12.3    • MV E' Tissue Velocity Se* 05/17/2023 10    • Tricuspid annular plane * 05/17/2023 2.30    • TR Peak Franc 05/17/2023 1.8    • IVSd 05/17/2023 3.70    • LV DIASTOLIC VOLUME (MOD* 05/17/2023 106    • LEFT VENTRICLE SYSTOLIC * 04/31/1122 44    • Left ventricular stroke * 05/17/2023 62.00    • A4C EF 05/17/2023 70    • LA length (A2C) 05/17/2023 5.00    • LVIDd 05/17/2023 4.80    • IVS 05/17/2023 0.8    • LVIDS 05/17/2023 3.30    • FS 05/17/2023 31    • Asc Ao 05/17/2023 3.1    • Ao root 05/17/2023 3.00    • RVID d 05/17/2023 3.0    • MV valve area p 1/2 meth* 05/17/2023 5.00    • E wave deceleration time 05/17/2023 153    • MV Peak E Franc 05/17/2023 56    • MV Peak A Franc 05/17/2023 0.56    • ISAIAS A4C 05/17/2023 12.7    • RAA A4C 05/17/2023 11.4    • MV stenosis pressure 1/2* 05/17/2023 44    • LVSV, 2D 05/17/2023 62    • Ventricular Rate 05/17/2023 89    • Atrial Rate 05/17/2023 89    • IN Interval 05/17/2023 174    • QRSD Interval 05/17/2023 90    • QT Interval 05/17/2023 366    • QTC Interval 05/17/2023 445    • P Axis 05/17/2023 78    • QRS Axis 05/17/2023 64    • T Wave Axis 05/17/2023 54    • Sodium 05/17/2023 140    • Potassium 05/17/2023 3.8    • Chloride 05/17/2023 109 (H)    • CO2 05/17/2023 26    • ANION GAP 05/17/2023 5    • BUN 05/17/2023 12    • Creatinine 05/17/2023 0.67    • Glucose 05/17/2023 103    • Calcium 05/17/2023 9.5    • eGFR 05/17/2023 95    • WBC 05/17/2023 6.62    • RBC 05/17/2023 4.11    • Hemoglobin 05/17/2023 12.2    • Hematocrit 05/17/2023 36.5    • MCV 05/17/2023 89    • MCH 05/17/2023 29.7    • MCHC 05/17/2023 33.4    • RDW 05/17/2023 13.5    • Platelets 36/56/9936 223    • MPV 05/17/2023 10.1    • Rest Nuclear Isotope Dose 05/17/2023 10.70    • Stress Nuclear Isotope D* 05/17/2023 29.00    • Baseline HR 05/17/2023 83    • Baseline BP 05/17/2023 128/72    • O2 sat rest 05/17/2023 98    • Stress peak HR 05/17/2023 166    • Post peak BP 05/17/2023 142    • Rate Pressure Product 05/17/2023 23,572.0    • O2 sat peak 05/17/2023 98    • Recovery HR 05/17/2023 100    • Recovery BP 05/17/2023 116/64    • Max HR 05/17/2023 166    • Max HR Percent 05/17/2023 103    • Exercise duration (min) 05/17/2023 6    • Exercise duration (sec) 05/17/2023 0    • Estimated workload 05/17/2023 7.0    • Stress Stage Reached 05/17/2023 2.0    • Protocol Name 05/17/2023 CHANEL    • Time In Exercise Phase 05/17/2023 00:06:00    • MAX. SYSTOLIC BP 30/36/6356 516    • Max Diastolic Bp 30/54/5283 56    • Max Heart Rate 05/17/2023 166    • Max Predicted Heart Rate 05/17/2023 160    • Reason for Termination 05/17/2023 Maximal exercise (symptom limited), SOB, FATIGUE    • Test Indication 05/17/2023 PALPITAIONS, BIGEMINY    • Target Hr Formular 05/17/2023 (220 - Age)*85%    • Arrhy During Ex 05/17/2023 ventricular premature beats    • Chest Pain Statement 05/17/2023 none      Imaging: No results found. Review of Systems:  Review of Systems    Physical Exam:  112/70. Heart rate 78 and regular. Carotids 2+ without bruits. Regular rhythm. No murmurs. Lungs clear. No edema. Discussion/Summary:    1. Periodic mild sinus tachycardia-possibly related to deconditioning  2. Benign PVCs    Recommendations:    1. Exercise  2.   Return 1 year or as needed      Jesse Marcial MD

## 2023-08-31 ENCOUNTER — TELEPHONE (OUTPATIENT)
Dept: CARDIOLOGY CLINIC | Facility: CLINIC | Age: 58
End: 2023-08-31

## 2023-08-31 NOTE — TELEPHONE ENCOUNTER
----- Message from Codi Craft sent at 8/31/2023 10:24 AM EDT -----  Regarding: Reclast  Contact: [phone]  Hi Dr Minh Bartholomew,    My endocrinologist wanted me to ask you if I can take reclast infusion for osteoporosis. She aware I have PVC but wanted to make sure I am clear to take reclast since I get several heart palpations.  Please advise   Thank you  Eron Ivy

## 2024-12-16 ENCOUNTER — OFFICE VISIT (OUTPATIENT)
Dept: CARDIOLOGY CLINIC | Facility: CLINIC | Age: 59
End: 2024-12-16
Payer: COMMERCIAL

## 2024-12-16 VITALS
HEIGHT: 65 IN | BODY MASS INDEX: 28.16 KG/M2 | HEART RATE: 82 BPM | WEIGHT: 169 LBS | RESPIRATION RATE: 16 BRPM | OXYGEN SATURATION: 98 % | DIASTOLIC BLOOD PRESSURE: 68 MMHG | SYSTOLIC BLOOD PRESSURE: 128 MMHG

## 2024-12-16 DIAGNOSIS — R00.2 PALPITATIONS: ICD-10-CM

## 2024-12-16 PROCEDURE — 99213 OFFICE O/P EST LOW 20 MIN: CPT | Performed by: INTERNAL MEDICINE

## 2024-12-16 RX ORDER — METOPROLOL SUCCINATE 25 MG/1
12.5 TABLET, EXTENDED RELEASE ORAL DAILY
Qty: 45 TABLET | Refills: 0 | Status: SHIPPED | OUTPATIENT
Start: 2024-12-16 | End: 2025-03-16

## 2024-12-16 RX ORDER — NYSTATIN 100000 U/G
CREAM TOPICAL 2 TIMES DAILY
COMMUNITY
Start: 2024-01-25 | End: 2025-01-19

## 2024-12-16 RX ORDER — IPRATROPIUM BROMIDE 21 UG/1
1-2 SPRAY, METERED NASAL 2 TIMES DAILY
COMMUNITY
Start: 2024-10-28

## 2024-12-16 NOTE — PROGRESS NOTES
[facility]  [address] [place] [address]  Cardiology Follow Up    Edith Tran  1965  [id number]    Assessment & Plan  Palpitations  -Etiology of palpitations is benign in nature.  She is clinically euvolemic.  The left ventricular systolic function was normal.  I have reviewed all the previous cardiology evaluations in great details and based upon those findings I have asked her to start taking Toprol-XL 12.5 mg daily.  There is no indication at the present time for any further testing.    Continue all medications. Previous studies reviewed with patient, medications reviewed and possible side effects discussed. Continue risk factor modification. Optimize weight, regular exercise and follow up with appropriate specialists and primary care physician as discussed.  All questions answered. Patient advised to report any problems prompting to medical attention. Return for follow up visit in as needed or earlier if needed    Chief Complaint   Patient presents with   • Follow-up       Interval History: Edith Tran is a pleasant 59-year-old female known case of Acquired Immune Deficiency Syndrome been getting the episodes of palpitations off and on.  She was taking the beta-blockers in the past however that was not renewed.  Her palpitation episodes are very intermittent in nature.  They are not accompanied by any other cardiac symptoms.  They generally resolve on its own.  Recently performed echocardiogram was normal.  I have reviewed all the previous cardiology evaluations in great details.          PMH:     Patient Active Problem List   Diagnosis   • Palpitations   • HIV infection (HCC)   • GERD (gastroesophageal reflux disease)     Past Medical History:   Diagnosis Date   • Back pain    • Hashimoto's disease    • HIV (human immunodeficiency virus infection) (HCC)    • Urinary tract infection      Social History     Socioeconomic History   • Marital status: /Civil Union      Spouse name: Not on file   • Number of children: Not on file   • Years of education: Not on file   • Highest education level: Not on file   Occupational History   • Not on file   Tobacco Use   • Smoking status: Never   • Smokeless tobacco: Never   Substance and Sexual Activity   • Alcohol use: Not Currently   • Drug use: Never   • Sexual activity: Not on file   Other Topics Concern   • Not on file   Social History Narrative   • Not on file     Social Drivers of Health     Financial Resource Strain: Not on file   Food Insecurity: Not on file   Transportation Needs: Not on file   Physical Activity: Not on file   Stress: Not on file   Social Connections: Not on file   Intimate Partner Violence: Not on file   Housing Stability: Not on file      History reviewed. No pertinent family history.  Past Surgical History:   Procedure Laterality Date   • CHOLECYSTECTOMY         Current Outpatient Medications:   •  CALCIUM MAGNESIUM ZINC PO, Take 1 tablet by mouth every morning, Disp: , Rfl:   •  Cholecalciferol 25 MCG (1000 UT) capsule, Take 1,000 Units by mouth, Disp: , Rfl:   •  Jjdmkzoxqp-Odjqohzp-Cqdlreu AF (ODEFSEY PO), Take by mouth, Disp: , Rfl:   •  estradiol (VAGIFEM, YUVAFEM) 10 MCG TABS vaginal tablet, Insert 1 tablet into the vagina 2 (two) times a week, Disp: , Rfl:   •  famotidine (PEPCID) 40 MG tablet, Take 40 mg by mouth daily, Disp: , Rfl:   •  ipratropium (ATROVENT) 0.03 % nasal spray, 1-2 sprays into each nostril 2 (two) times a day, Disp: , Rfl:   •  ketoconazole (NIZORAL) 2 % shampoo, apply daily three times a week to FLAKY RASH ON SCALP, LEAVE 5 TO 10 MINUTES BEFORE WASHING, Disp: , Rfl:   •  metoprolol succinate (TOPROL-XL) 25 mg 24 hr tablet, Take 0.5 tablets (12.5 mg total) by mouth daily, Disp: 45 tablet, Rfl: 0  •  nystatin (MYCOSTATIN) cream, Apply topically 2 (two) times a day, Disp: , Rfl:   •  clobetasol (TEMOVATE) 0.05 % external solution, APPLY A THIN LAYER TWICE A DAY FOR 2 WEEKS if needed  "TO ITCHY/FLAKY SKIN ON SCALP (Patient not taking: Reported on 12/16/2024), Disp: , Rfl:   •  minoxidil (LONITEN) 2.5 mg tablet, take 1/2 tablet by mouth daily for HAIR LOSS (Patient not taking: Reported on 12/16/2024), Disp: , Rfl:   •  nitrofurantoin (MACROBID) 100 mg capsule, Take 1 capsule (100 mg total) by mouth daily at bedtime (Patient not taking: Reported on 8/7/2023), Disp: , Rfl: 0  •  Odefsey tablet, , Disp: , Rfl:   Allergies   Allergen Reactions   • Sesame Oil - Food Allergy    • Sulfa Antibiotics Rash           Review of Systems:  Review of Systems   Constitutional: Negative.    HENT: Negative.     Eyes: Negative.    Respiratory: Negative.     Cardiovascular:  Positive for palpitations.   Musculoskeletal: Negative.    Neurological: Negative.    Psychiatric/Behavioral: Negative.           /66 (BP Location: Left arm, Patient Position: Sitting, Cuff Size: Standard)   Pulse 82   Resp 16   Ht 5' 5\" (1.651 m)   Wt 76.7 kg (169 lb)   SpO2 98%   BMI 28.12 kg/m²     Physical Exam:  Physical Exam  Vitals reviewed.   Constitutional:       Appearance: Normal appearance.   HENT:      Head: Normocephalic.   Eyes:      Pupils: Pupils are equal, round, and reactive to light.   Cardiovascular:      Rate and Rhythm: Regular rhythm.      Heart sounds: Normal heart sounds.   Pulmonary:      Effort: Pulmonary effort is normal.      Breath sounds: Normal breath sounds. No wheezing.   Abdominal:      General: Abdomen is flat.      Palpations: Abdomen is soft.   Skin:     General: Skin is warm.   Neurological:      Mental Status: She is alert.       Time spent 25 minutes in reviewing outpatient notes, reviewing and interpreting labs,  reviewing interpreting EKG and other cardiac studies, discussion and educating patient, documentation.     "

## 2024-12-16 NOTE — ASSESSMENT & PLAN NOTE
-Etiology of palpitations is benign in nature.  She is clinically euvolemic.  The left ventricular systolic function was normal.  I have reviewed all the previous cardiology evaluations in great details and based upon those findings I have asked her to start taking Toprol-XL 12.5 mg daily.  There is no indication at the present time for any further testing.

## 2025-01-22 ENCOUNTER — TELEPHONE (OUTPATIENT)
Dept: CARDIOLOGY CLINIC | Facility: CLINIC | Age: 60
End: 2025-01-22

## 2025-01-22 NOTE — TELEPHONE ENCOUNTER
Hi Dr Nichols,     I started the Metoprolol as directed but it seems I am getting more heart palpitations, so I stopped it. Heart palpations is less but still there. Should I make a follow appointment with you. Please advised.   Thank you  Edith Tran       Please see pt's message and advise.     Thanks!

## 2025-02-24 DIAGNOSIS — R00.2 PALPITATIONS: ICD-10-CM

## 2025-02-25 RX ORDER — METOPROLOL SUCCINATE 25 MG/1
12.5 TABLET, EXTENDED RELEASE ORAL DAILY
Qty: 45 TABLET | Refills: 3 | Status: SHIPPED | OUTPATIENT
Start: 2025-02-25 | End: 2026-02-20

## 2025-08-11 ENCOUNTER — TELEPHONE (OUTPATIENT)
Age: 60
End: 2025-08-11